# Patient Record
Sex: FEMALE | Race: WHITE | NOT HISPANIC OR LATINO | Employment: OTHER | URBAN - METROPOLITAN AREA
[De-identification: names, ages, dates, MRNs, and addresses within clinical notes are randomized per-mention and may not be internally consistent; named-entity substitution may affect disease eponyms.]

---

## 2017-04-29 ENCOUNTER — HOSPITAL ENCOUNTER (EMERGENCY)
Facility: CLINIC | Age: 47
Discharge: HOME | End: 2017-04-29
Attending: PHYSICIAN ASSISTANT | Admitting: PHYSICIAN ASSISTANT
Payer: COMMERCIAL

## 2017-04-29 VITALS
DIASTOLIC BLOOD PRESSURE: 77 MMHG | TEMPERATURE: 98.2 F | RESPIRATION RATE: 16 BRPM | OXYGEN SATURATION: 100 % | SYSTOLIC BLOOD PRESSURE: 121 MMHG

## 2017-04-29 DIAGNOSIS — H61.23 BILATERAL IMPACTED CERUMEN: Primary | ICD-10-CM

## 2017-04-29 PROCEDURE — 99213 OFFICE O/P EST LOW 20 MIN: CPT | Performed by: PHYSICIAN ASSISTANT

## 2017-04-29 PROCEDURE — 69209 REMOVE IMPACTED EAR WAX UNI: CPT

## 2017-04-29 PROCEDURE — 99213 OFFICE O/P EST LOW 20 MIN: CPT

## 2017-04-29 ASSESSMENT — ENCOUNTER SYMPTOMS
FEVER: 0
NEUROLOGICAL NEGATIVE: 1
MUSCULOSKELETAL NEGATIVE: 1
RESPIRATORY NEGATIVE: 1
CONSTITUTIONAL NEGATIVE: 1

## 2017-04-29 NOTE — DISCHARGE INSTRUCTIONS
Debrox over the counter for treatment of ear wax impaction at home     Earwax (Treated)    Everyone produces earwax from the lining of the ear canal. It lubricates and protects the ear. The wax that forms in the canal slowly moves toward the outside of the ear and falls out. Sometimes wax can build up in the ear canal. This can cause a blockage and loss of hearing. A buildup of earwax was removed from your ear today.  Home care  If you have a tendency to build up wax in the ear canal, you should clear the wax at home regularly, before it causes discomfort. This should be about once every six months.    Unless a medicine was prescribed, you may use an over-the-counter product made for clearing earwax. These contain carbamide peroxide and are available over-the-counter in a kit with a small bulb syringe.    Lie down with the blocked ear facing upward. Apply one dropper full of medicine and wait a few minutes. Grasp the outer ear and wiggle it to help the solution enter the canal.    Lean over a sink or basin with the blocked ear turned downward. Use a rubber bulb syringe filled with warm (not hot or cold) water to rinse the ear several times. Use gentle pressure only. You may need to repeat the irrigation several times before the wax flows out.    If you are having trouble draining all the water out of your ear canal, put a few drops of rubbing alcohol into the ear canal. This will help remove the remaining water.  Don'ts    Don t use cold water to rinse the ear. This will make you dizzy.    Don t perform this procedure if you have an ear infection (ear pain, fever, or fluid draining from the ear).    Don t perform this procedure if you have a punctured eardrum.    Don t use cotton swabs, matches, hairpins, keys, or other objects to  clean  the ear canal. This can cause infection of the ear canal or rupture of the eardrum. Because of their size and shape, cotton swabs can push the earwax deeper into the ear canal  instead of removing it.  Follow-up care  Follow up with your health care provider, or as advised.  When to seek medical advice  Call your health care provider right away if any of these occur:    Worsening ear pain    Fever of 101 F (38.3 C) or higher, or as directed by your health care provider    Hearing does not return to normal after three days of treatment    Fluid drainage or bleeding from the ear canal    Swelling, redness, or tenderness of the outer ear    Headache, neck pain, or stiff neck    5240-4969 [employer]. [address] [place] [address]. All rights reserved. This information is not intended as a substitute for professional medical care. Always follow your healthcare professional's instructions.

## 2017-04-29 NOTE — ED AVS SNAPSHOT
[facility] Emergency Department    [address] [address]    Phone:  [phone]    Fax:  [phone]                                       Hollie Suarez   MRN: [id number]    Department:  [facility] Emergency Department   Date of Visit:  4/29/2017           After Visit Summary Signature Page     I have received my discharge instructions, and my questions have been answered. I have discussed any challenges I see with this plan with the nurse or doctor.    ..........................................................................................................................................  Patient/Patient Representative Signature      ..........................................................................................................................................  Patient Representative Print Name and Relationship to Patient    ..................................................               ................................................  Date                                            Time    ..........................................................................................................................................  Reviewed by Signature/Title    ...................................................              ..............................................  Date                                                            Time

## 2017-04-29 NOTE — ED AVS SNAPSHOT
[facility] Emergency Department    [address] [address]    Phone:  [phone]    Fax:  [phone]                                       Hollie Suarez   MRN: [id number]    Department:  [facility] Emergency Department   Date of Visit:  4/29/2017           Patient Information     Date Of Birth          1970        Your diagnoses for this visit were:     Bilateral impacted cerumen        You were seen by Ashley Vallejo PA-C.      Follow-up Information     Call Wale Ortega MD.    Specialty:  Family Practice    Why:  As needed, For persistent symptoms    Contact information:    [facility]         [address] [place] [address] [phone]          Follow up with [facility] Emergency Department.    Specialty:  EMERGENCY MEDICINE    Why:  As needed, If symptoms worsen    Contact information:    [address] [place] [address] [phone]    Additional information:    The medical center is located at   [address] [place] [address] (between [facility] and   [facility] [place], four miles north   of [place]).        Discharge Instructions       Debrox over the counter for treatment of ear wax impaction at home     Earwax (Treated)    Everyone produces earwax from the lining of the ear canal. It lubricates and protects the ear. The wax that forms in the canal slowly moves toward the outside of the ear and falls out. Sometimes wax can build up in the ear canal. This can cause a blockage and loss of hearing. A buildup of earwax was removed from your ear today.  Home care  If you have a tendency to build up wax in the ear canal, you should clear the wax at home regularly, before it causes discomfort. This should be about once every six months.    Unless a medicine was prescribed, you may use an over-the-counter product made for clearing earwax. These contain carbamide peroxide and are available over-the-counter in a  kit with a small bulb syringe.    Lie down with the blocked ear facing upward. Apply one dropper full of medicine and wait a few minutes. Grasp the outer ear and wiggle it to help the solution enter the canal.    Lean over a sink or basin with the blocked ear turned downward. Use a rubber bulb syringe filled with warm (not hot or cold) water to rinse the ear several times. Use gentle pressure only. You may need to repeat the irrigation several times before the wax flows out.    If you are having trouble draining all the water out of your ear canal, put a few drops of rubbing alcohol into the ear canal. This will help remove the remaining water.  Don'ts    Don t use cold water to rinse the ear. This will make you dizzy.    Don t perform this procedure if you have an ear infection (ear pain, fever, or fluid draining from the ear).    Don t perform this procedure if you have a punctured eardrum.    Don t use cotton swabs, matches, hairpins, keys, or other objects to  clean  the ear canal. This can cause infection of the ear canal or rupture of the eardrum. Because of their size and shape, cotton swabs can push the earwax deeper into the ear canal instead of removing it.  Follow-up care  Follow up with your health care provider, or as advised.  When to seek medical advice  Call your health care provider right away if any of these occur:    Worsening ear pain    Fever of 101 F (38.3 C) or higher, or as directed by your health care provider    Hearing does not return to normal after three days of treatment    Fluid drainage or bleeding from the ear canal    Swelling, redness, or tenderness of the outer ear    Headache, neck pain, or stiff neck    3517-3015 [employer]. [address] [place] [address]. All rights reserved. This information is not intended as a substitute for professional medical care. Always follow your healthcare professional's instructions.          24 Hour Appointment Hotline        To make an appointment at any [facility] call [phone] ([phone]). If you don't have a family doctor or clinic, we will help you find one. [facility] clinics are conveniently located to serve the needs of you and your family.             Review of your medicines      Our records show that you are taking the medicines listed below. If these are incorrect, please call your family doctor or clinic.        Dose / Directions Last dose taken    ADVIL 200 MG tablet   Generic drug:  ibuprofen        4 tablets by mouth every 8 hours as needed   Refills:  0        bisacodyl 5 MG EC tablet   Commonly known as:  DULCOLAX   Dose:  5 mg   Quantity:  30 tablet        Take 1 tablet (5 mg) by mouth daily as needed for constipation   Refills:  0        DIAZEPAM PO   Dose:  2 mg        Take 2 mg by mouth as needed for anxiety   Refills:  0        LATISSE 0.03 % Soln   Generic drug:  bimatoprost        Externally apply topically every evening To eyelashes   Refills:  0        LEXAPRO PO   Dose:  5 mg        Take 5 mg by mouth daily   Refills:  0        PHENTERMINE HCL PO   Dose:  15 mg        Take 15 mg by mouth daily   Refills:  0                Orders Needing Specimen Collection     None      Pending Results     No orders found from 4/27/2017 to 4/30/2017.            Pending Culture Results     No orders found from 4/27/2017 to 4/30/2017.            Test Results From Your Hospital Stay               Thank you for choosing [facility]       Thank you for choosing [facility] for your care. Our goal is always to provide you with excellent care. Hearing back from our patients is one way we can continue to improve our services. Please take a few minutes to complete the written survey that you may receive in the mail after you visit with us. Thank you!        [employer] Information     [employer] lets you send messages to your doctor, view your test results, renew your prescriptions, schedule appointments and more. To sign up, go to  [email or web address] . Click on \"Log in\" on the left side of the screen, which will take you to the Welcome page. Then click on \"Sign up Now\" on the right side of the page.     You will be asked to enter the access code listed below, as well as some personal information. Please follow the directions to create your username and password.     Your access code is: [email or web address]  Expires: 2017  3:34 PM     Your access code will  in 90 days. If you need help or a new code, please call your [place] [facility] clinic or [phone].        Care EveryWhere ID     This is your Care EveryWhere ID. This could be used by other organizations to access your [facility] medical records  [id number]        After Visit Summary       This is your record. Keep this with you and show to your community pharmacist(s) and doctor(s) at your next visit.                  "

## 2017-04-29 NOTE — ED PROVIDER NOTES
"  History     Chief Complaint   Patient presents with     Otalgia     Having R ear pain, having a hard time hearing too.     HPI  Hollie Suarez is a 46 year old female who presents with complaints of bilateral muffled hearing for the past 2 weeks.  States she developed pain in her right ear last night.  Pt reports the sensation that she feels like she is \"underwater.\"  Denies fevers, chills, cough, sore throat, sinus pressure, nasal congestion, rhinorrhea, or ear drainage.    I have reviewed the Medications, Allergies, Past Medical and Surgical History, and Social History in the Epic system.    Review of Systems   Constitutional: Negative.  Negative for fever.   HENT: Positive for ear pain. Negative for ear discharge.    Respiratory: Negative.    Musculoskeletal: Negative.    Skin: Negative.    Neurological: Negative.    All other systems reviewed and are negative.      Physical Exam   BP: 121/77  Heart Rate: 73  Temp: 98.2  F (36.8  C)  Resp: 16  SpO2: 100 %  Physical Exam   Constitutional: She is oriented to person, place, and time. She appears well-developed and well-nourished. No distress.   HENT:   Head: Normocephalic and atraumatic.   Right Ear: Tympanic membrane, external ear and ear canal normal.   Left Ear: Tympanic membrane, external ear and ear canal normal.   Nose: Nose normal. No rhinorrhea.   Mouth/Throat: Oropharynx is clear and moist. No oropharyngeal exudate or posterior oropharyngeal erythema.   Bilateral cerumen impactions; after irrigation, no evidence of otitis media or externa   Eyes: Conjunctivae and EOM are normal. Pupils are equal, round, and reactive to light.   Neck: Normal range of motion. Neck supple. No rigidity.   Cardiovascular: Normal rate, regular rhythm and normal heart sounds.    Pulmonary/Chest: Effort normal and breath sounds normal. No stridor. No respiratory distress. She has no wheezes.   Lymphadenopathy:     She has no cervical adenopathy.   Neurological: She is alert " "and oriented to person, place, and time.   Skin: Skin is warm and dry.       ED Course     ED Course     Procedures      Assessments & Plan (with Medical Decision Making)     Pt is a 46 year old female who presents with complaints of bilateral muffled hearing for the past 2 weeks.  States she developed pain in her right ear last night.  Pt reports the sensation that she feels like she is \"underwater.\"  Pt is afebrile on arrival.  Exam as above.  Pt is noted to have bilateral cerumen impactions.  No evidence of otitis media or externa after ear irrigation.  Pt states she feels much better and symptoms have resolved.  Hand-outs provided.    Patient was instructed to follow-up with PCP as needed for continued care and management or sooner if new or worsening symptoms.  She is to return to the ED for persistent and/or worsening symptoms.  Patient expressed understanding of the diagnosis and plan and was discharged home in good condition.    I have reviewed the nursing notes.    I have reviewed the findings, diagnosis, plan and need for follow up with the patient.    Discharge Medication List as of 4/29/2017  3:35 PM          Final diagnoses:   Bilateral impacted cerumen       4/29/2017   [facility] EMERGENCY DEPARTMENT     Ashley Vallejo PA-C  04/29/17 4333       Ashley Vallejo PA-C  04/29/17 1706    "

## 2018-03-15 ENCOUNTER — HOSPITAL ENCOUNTER (EMERGENCY)
Facility: CLINIC | Age: 48
Discharge: HOME | End: 2018-03-15
Attending: EMERGENCY MEDICINE | Admitting: EMERGENCY MEDICINE
Payer: COMMERCIAL

## 2018-03-15 VITALS
DIASTOLIC BLOOD PRESSURE: 77 MMHG | OXYGEN SATURATION: 100 % | SYSTOLIC BLOOD PRESSURE: 114 MMHG | TEMPERATURE: 98.6 F | RESPIRATION RATE: 13 BRPM

## 2018-03-15 DIAGNOSIS — R07.2 SUBSTERNAL CHEST PAIN: ICD-10-CM

## 2018-03-15 LAB
ALBUMIN SERPL-MCNC: 3.6 G/DL (ref 3.4–5)
ALP SERPL-CCNC: 66 U/L (ref 40–150)
ALT SERPL W P-5'-P-CCNC: 22 U/L (ref 0–50)
ANION GAP SERPL CALCULATED.3IONS-SCNC: 7 MMOL/L (ref 3–14)
AST SERPL W P-5'-P-CCNC: 29 U/L (ref 0–45)
BASOPHILS # BLD AUTO: 0 10E9/L (ref 0–0.2)
BASOPHILS NFR BLD AUTO: 0.5 %
BILIRUB SERPL-MCNC: 0.5 MG/DL (ref 0.2–1.3)
BUN SERPL-MCNC: 11 MG/DL (ref 7–30)
CALCIUM SERPL-MCNC: 8.7 MG/DL (ref 8.5–10.1)
CHLORIDE SERPL-SCNC: 107 MMOL/L (ref 94–109)
CO2 SERPL-SCNC: 27 MMOL/L (ref 20–32)
CREAT SERPL-MCNC: 0.69 MG/DL (ref 0.52–1.04)
DIFFERENTIAL METHOD BLD: NORMAL
EOSINOPHIL # BLD AUTO: 0.1 10E9/L (ref 0–0.7)
EOSINOPHIL NFR BLD AUTO: 1.7 %
ERYTHROCYTE [DISTWIDTH] IN BLOOD BY AUTOMATED COUNT: 12.5 % (ref 10–15)
GFR SERPL CREATININE-BSD FRML MDRD: 90 ML/MIN/1.7M2
GLUCOSE SERPL-MCNC: 85 MG/DL (ref 70–99)
HCT VFR BLD AUTO: 42.9 % (ref 35–47)
HGB BLD-MCNC: 14.4 G/DL (ref 11.7–15.7)
IMM GRANULOCYTES # BLD: 0 10E9/L (ref 0–0.4)
IMM GRANULOCYTES NFR BLD: 0 %
LYMPHOCYTES # BLD AUTO: 1.8 10E9/L (ref 0.8–5.3)
LYMPHOCYTES NFR BLD AUTO: 31 %
MCH RBC QN AUTO: 29.8 PG (ref 26.5–33)
MCHC RBC AUTO-ENTMCNC: 33.6 G/DL (ref 31.5–36.5)
MCV RBC AUTO: 89 FL (ref 78–100)
MONOCYTES # BLD AUTO: 0.4 10E9/L (ref 0–1.3)
MONOCYTES NFR BLD AUTO: 7.4 %
NEUTROPHILS # BLD AUTO: 3.4 10E9/L (ref 1.6–8.3)
NEUTROPHILS NFR BLD AUTO: 59.4 %
PLATELET # BLD AUTO: 263 10E9/L (ref 150–450)
POTASSIUM SERPL-SCNC: 4.2 MMOL/L (ref 3.4–5.3)
PROT SERPL-MCNC: 7.3 G/DL (ref 6.8–8.8)
RBC # BLD AUTO: 4.83 10E12/L (ref 3.8–5.2)
SODIUM SERPL-SCNC: 141 MMOL/L (ref 133–144)
TROPONIN I SERPL-MCNC: <0.015 UG/L (ref 0–0.04)
WBC # BLD AUTO: 5.8 10E9/L (ref 4–11)

## 2018-03-15 PROCEDURE — 80053 COMPREHEN METABOLIC PANEL: CPT | Performed by: EMERGENCY MEDICINE

## 2018-03-15 PROCEDURE — 99283 EMERGENCY DEPT VISIT LOW MDM: CPT | Mod: Z6 | Performed by: EMERGENCY MEDICINE

## 2018-03-15 PROCEDURE — 85025 COMPLETE CBC W/AUTO DIFF WBC: CPT | Performed by: EMERGENCY MEDICINE

## 2018-03-15 PROCEDURE — 93010 ELECTROCARDIOGRAM REPORT: CPT | Mod: Z6 | Performed by: EMERGENCY MEDICINE

## 2018-03-15 PROCEDURE — 93005 ELECTROCARDIOGRAM TRACING: CPT | Performed by: EMERGENCY MEDICINE

## 2018-03-15 PROCEDURE — 84484 ASSAY OF TROPONIN QUANT: CPT | Performed by: EMERGENCY MEDICINE

## 2018-03-15 PROCEDURE — 99283 EMERGENCY DEPT VISIT LOW MDM: CPT | Performed by: EMERGENCY MEDICINE

## 2018-03-15 NOTE — LETTER
March 15, 2018      To Whom It May Concern:      Hollie Suarez was seen in our Emergency Department today, 03/15/18.   Patient was evaluated in the emergency department for chest discomfort.  Please excuse her from employment today.  May return to full work duty tomorrow.      Sincerely,    Corby Collier Dr.  [facility] ED Staff Physician

## 2018-03-15 NOTE — ED AVS SNAPSHOT
[facility] Emergency Department    [address] [address]    Phone:  [phone]    Fax:  [phone]                                       Hollie Suarez   MRN: [id number]    Department:  [facility] Emergency Department   Date of Visit:  3/15/2018           After Visit Summary Signature Page     I have received my discharge instructions, and my questions have been answered. I have discussed any challenges I see with this plan with the nurse or doctor.    ..........................................................................................................................................  Patient/Patient Representative Signature      ..........................................................................................................................................  Patient Representative Print Name and Relationship to Patient    ..................................................               ................................................  Date                                            Time    ..........................................................................................................................................  Reviewed by Signature/Title    ...................................................              ..............................................  Date                                                            Time

## 2018-03-15 NOTE — ED PROVIDER NOTES
"  History     Chief Complaint   Patient presents with     Chest Pain     Pt went to work this morning, feeling fine.  Had coffee this morning, no food.  Started having chest pain in middle of chest and into L shoulder.  Pain has been off and on \"a little.      HPI     Hollie Suarez is a 47 year old female who has a history of smoking, genital herpes, hysterectomy, back pain, and human papillomavirus who presents to the ED via private car for evaluation of chest pain. Her discomfort began substernal and radiated to her left shoulder.  The pain is described as pressure or an ache, she denied any sharp pain.  She states she is in some discomfort at this time.  She has been belching and burping along with her discomfort.  No discomfort on exertion.  Patient states she awoke this morning and was able to perform her normal routine until 8:00 am.  At first she believed it to be indigestion.  This episode lasted 5-10 minutes, wax and wanes.  She states the pain is different than previous symptoms of indigestion.     She denies any history of heart disease or hypertension but does report she has had a small cough for a few weeks.  She does not feel this cough is any different than normal.        Problem List:    Patient Active Problem List    Diagnosis Date Noted     Constipation, unspecified constipation type 11/09/2015     Priority: Medium     Refused influenza vaccine 11/09/2015     Priority: Medium     Refused diphtheria-pertussis-tetanus (DPT) vaccination 11/09/2015     Priority: Medium     URI (upper respiratory infection) 11/25/2013     Priority: Medium     S/P hysterectomy 04/29/2011     Priority: Medium     Back pain 04/29/2011     Priority: Medium     CARDIOVASCULAR SCREENING; LDL GOAL LESS THAN 160 10/31/2010     Priority: Medium     Infantile cerebral palsy (H) 12/21/2007     Priority: Medium     Occasional valium use  Problem list name updated by automated process. Provider to review       Human " papillomavirus in conditions classified elsewhere and of unspecified site 10/22/2007     Priority: Medium     10/10/07:Pap--NIL, + HPV 66. Repap with HPV 1 year.       Genital herpes 03/20/2007     Priority: Medium     Problem list name updated by automated process. Provider to review          Past Medical History:    No past medical history on file.    Past Surgical History:    Past Surgical History:   Procedure Laterality Date     HYSTERECTOMY, PAP NO LONGER INDICATED  2009    Total        Family History:    Family History   Problem Relation Age of Onset     CANCER Mother      smooth muscle tissues     Neurologic Disorder Father      aneurism     CANCER Maternal Grandmother      cervical cancer       Social History:  Marital Status:   [2]  Social History   Substance Use Topics     Smoking status: Former Smoker     Packs/day: 1.00     Types: Cigarettes     Quit date: 9/1/2015     Smokeless tobacco: Not on file     Alcohol use Yes      Comment: 1 glass of wine weekly        Medications:      Escitalopram Oxalate (LEXAPRO PO)   PHENTERMINE HCL PO   DIAZEPAM PO   bimatoprost (LATISSE) 0.03 % SOLN   bisacodyl (DULCOLAX) 5 MG EC tablet   ADVIL 200 MG OR TABS         Review of Systems  All pertinent positives and negatives are documented in the HPI.  All others reviewed and are negative .    Physical Exam   BP: (!) 137/96  Heart Rate: 79  Temp: 98.6  F (37  C)  Resp: 16  SpO2: 99 %      Physical Exam  Vital signs reviewed  Nursing notes reviewed  Head:  Normocephalic.    Eyes:  PERRLA, full EOM.  External exams normal.    Ears:  Normal pinnae, canals, and TM's.    Nose:  Patent, without deformity.    Throat:  Moist mucous membranes without lesions, erythema, or exudate.    Neck:  Supple, without masses, lymphadenopathy or tenderness.    Respiratory:  Normal respiratory effort.  Lungs are clear with good breath sounds.    Heart:  RR without murmurs, rubs, or gallops.  Abdomen: No right upper quadrant epigastric  discomfort.  Abdomen is nontender.  Lower extremities show a muscle atrophy and some joint contracture from cerebral palsy.  Skin: Warm, dry with no diaphoresis.  No rash.    ED Course     ED Course     Procedures  EKG:  Interpretation by Corby Collier DO.   Indication: Chest pain  Normal sinus rhythm.  Rate is 7 bpm.  Axis.  Normal repolarization.  No ectopy.  Comparison EKG dated 11/25/2007 shows no change.    Impressions normal EKG          11:08 AM Patient assessed  12:41 PM  Results for orders placed or performed during the hospital encounter of 03/15/18   CBC with platelets differential   Result Value Ref Range    WBC 5.8 4.0 - 11.0 10e9/L    RBC Count 4.83 3.8 - 5.2 10e12/L    Hemoglobin 14.4 11.7 - 15.7 g/dL    Hematocrit 42.9 35.0 - 47.0 %    MCV 89 78 - 100 fl    MCH 29.8 26.5 - 33.0 pg    MCHC 33.6 31.5 - 36.5 g/dL    RDW 12.5 10.0 - 15.0 %    Platelet Count 263 150 - 450 10e9/L    Diff Method Automated Method     % Neutrophils 59.4 %    % Lymphocytes 31.0 %    % Monocytes 7.4 %    % Eosinophils 1.7 %    % Basophils 0.5 %    % Immature Granulocytes 0.0 %    Absolute Neutrophil 3.4 1.6 - 8.3 10e9/L    Absolute Lymphocytes 1.8 0.8 - 5.3 10e9/L    Absolute Monocytes 0.4 0.0 - 1.3 10e9/L    Absolute Eosinophils 0.1 0.0 - 0.7 10e9/L    Absolute Basophils 0.0 0.0 - 0.2 10e9/L    Abs Immature Granulocytes 0.0 0 - 0.4 10e9/L   Comprehensive metabolic panel   Result Value Ref Range    Sodium 141 133 - 144 mmol/L    Potassium 4.2 3.4 - 5.3 mmol/L    Chloride 107 94 - 109 mmol/L    Carbon Dioxide 27 20 - 32 mmol/L    Anion Gap 7 3 - 14 mmol/L    Glucose 85 70 - 99 mg/dL    Urea Nitrogen 11 7 - 30 mg/dL    Creatinine 0.69 0.52 - 1.04 mg/dL    GFR Estimate 90 >60 mL/min/1.7m2    GFR Estimate If Black >90 >60 mL/min/1.7m2    Calcium 8.7 8.5 - 10.1 mg/dL    Bilirubin Total 0.5 0.2 - 1.3 mg/dL    Albumin 3.6 3.4 - 5.0 g/dL    Protein Total 7.3 6.8 - 8.8 g/dL    Alkaline Phosphatase 66 40 - 150 U/L    ALT 22 0 - 50 U/L     AST 29 0 - 45 U/L   Troponin I   Result Value Ref Range    Troponin I ES <0.015 0.000 - 0.045 ug/L       Assessments & Plan (with Medical Decision Making)  47-year-old female, smoker presents with substernal chest discomfort.  Onset while sitting at her desk after drinking coffee.  Moved into the left shoulder.  No other associated symptoms.  Resolved by the time she arrived to the emergency department.    Examination cardiopulmonary normal.  No calf swelling or asymmetry.  No Homans test.    EKG normal.  Troponin normal.  Suspect atypical causes for chest discomfort.  Only risk factor is smoking.  Advised if she starts having recurrent symptoms or exertional symptoms she needs to contact her primary clinic provider to arrange for stress echo dobutamine.  Unable to fully exercise on a treadmill due to CP.       I have reviewed the nursing notes.    I have reviewed the findings, diagnosis, plan and need for follow up with the patient.      New Prescriptions    No medications on file       Final diagnoses:   Substernal chest pain     This document serves as a record of the services and decisions personally performed and made by Corby Collier, *. It was created on HIS/HER behalf by Sadaf Ramirez, a trained medical scribe. The creation of this document is based the provider's statements to the medical scribe.  Sadaf Ramirez 10:23 AM 3/15/2018    Provider:   The information in this document, created by the medical scribe for me, accurately reflects the services I personally performed and the decisions made by me. I have reviewed and approved this document for accuracy prior to leaving the patient care area.  Corby Collier, * 10:23 AM 3/15/2018    3/15/2018   [facility] EMERGENCY DEPARTMENT     Corby Collier,   03/15/18 1246

## 2018-03-15 NOTE — ED NOTES
"Pt c/o mid sternal chest pain - radiates to her left shoulder - started this morning. Patient states, \"it kind of feels like indigestion, but I'm not sure and I smoke so I want to be checked out\" - Pt currently denies any 'pain' although describes as uncomfortable. Patient works at home and has CP so she is fairly sedentary. Denies any leg pain or shortness of breath.  "

## 2018-03-15 NOTE — ED AVS SNAPSHOT
[facility] Emergency Department    [address] [address]    Phone:  [phone]    Fax:  [phone]                                       Hollie Suarez   MRN: [id number]    Department:  [facility] Emergency Department   Date of Visit:  3/15/2018           Patient Information     Date Of Birth          1970        Your diagnoses for this visit were:     Substernal chest pain        You were seen by Corby Collier DO.        Discharge Instructions       Cardiac monitor showed no cardiac arrhythmia during emergency room visit.  12-lead EKG was normal.  Cardiac enzyme = troponin was normal.  Cardiac examination was normal.  At this time is no indication that discomfort came from cardiac source.  Could have been transient discomfort from esophageal spasm after drinking coffee.  I recommend continuing to monitor for any recurrence of substernal chest discomfort.  Specifically be monitoring for activity-induced symptoms.  If noted contact your primary clinic provider to arrange for dobutamine cardiac stress echo.  This is a stress test that would allow us to further evaluate for coronary artery disease.  This test does not require running on a treadmill.     Strongly advised to follow-up the primary clinic provider to ask them to assist in developing a smoking cessation program.      24 Hour Appointment Hotline       To make an appointment at any [facility], call [phone] ([phone]). If you don't have a family doctor or clinic, we will help you find one. [facility] clinics are conveniently located to serve the needs of you and your family.             Review of your medicines      Our records show that you are taking the medicines listed below. If these are incorrect, please call your family doctor or clinic.        Dose / Directions Last dose taken    ADVIL 200 MG tablet   Generic drug:  ibuprofen        4 tablets by mouth every 8 hours as needed    Refills:  0        bisacodyl 5 MG EC tablet   Commonly known as:  DULCOLAX   Dose:  5 mg   Quantity:  30 tablet        Take 1 tablet (5 mg) by mouth daily as needed for constipation   Refills:  0        DIAZEPAM PO   Dose:  2 mg        Take 2 mg by mouth as needed for anxiety   Refills:  0        LATISSE 0.03 % Soln   Generic drug:  bimatoprost        Externally apply topically every evening To eyelashes   Refills:  0        LEXAPRO PO   Dose:  5 mg        Take 5 mg by mouth daily   Refills:  0        PHENTERMINE HCL PO   Dose:  15 mg        Take 15 mg by mouth daily   Refills:  0                Procedures and tests performed during your visit     CBC with platelets differential    Comprehensive metabolic panel    EKG 12 lead    Peripheral IV catheter    Troponin I      Orders Needing Specimen Collection     None      Pending Results     No orders found from 3/13/2018 to 3/16/2018.            Pending Culture Results     No orders found from 3/13/2018 to 3/16/2018.            Pending Results Instructions     If you had any lab results that were not finalized at the time of your Discharge, you can call the ED Lab Result RN at [phone]. You will be contacted by this team for any positive Lab results or changes in treatment. The nurses are available 7 days a week from 10A to 6:30P.  You can leave a message 24 hours per day and they will return your call.        Test Results From Your Hospital Stay        3/15/2018 10:44 AM      Component Results     Component Value Ref Range & Units Status    WBC 5.8 4.0 - 11.0 10e9/L Final    RBC Count 4.83 3.8 - 5.2 10e12/L Final    Hemoglobin 14.4 11.7 - 15.7 g/dL Final    Hematocrit 42.9 35.0 - 47.0 % Final    MCV 89 78 - 100 fl Final    MCH 29.8 26.5 - 33.0 pg Final    MCHC 33.6 31.5 - 36.5 g/dL Final    RDW 12.5 10.0 - 15.0 % Final    Platelet Count 263 150 - 450 10e9/L Final    Diff Method Automated Method  Final    % Neutrophils 59.4 % Final    % Lymphocytes 31.0 % Final     % Monocytes 7.4 % Final    % Eosinophils 1.7 % Final    % Basophils 0.5 % Final    % Immature Granulocytes 0.0 % Final    Absolute Neutrophil 3.4 1.6 - 8.3 10e9/L Final    Absolute Lymphocytes 1.8 0.8 - 5.3 10e9/L Final    Absolute Monocytes 0.4 0.0 - 1.3 10e9/L Final    Absolute Eosinophils 0.1 0.0 - 0.7 10e9/L Final    Absolute Basophils 0.0 0.0 - 0.2 10e9/L Final    Abs Immature Granulocytes 0.0 0 - 0.4 10e9/L Final         3/15/2018 11:28 AM      Component Results     Component Value Ref Range & Units Status    Sodium 141 133 - 144 mmol/L Final    Potassium 4.2 3.4 - 5.3 mmol/L Final    Specimen slightly hemolyzed, potassium may be falsely elevated    Chloride 107 94 - 109 mmol/L Final    Carbon Dioxide 27 20 - 32 mmol/L Final    Anion Gap 7 3 - 14 mmol/L Final    Glucose 85 70 - 99 mg/dL Final    Urea Nitrogen 11 7 - 30 mg/dL Final    Creatinine 0.69 0.52 - 1.04 mg/dL Final    GFR Estimate 90 >60 mL/min/1.7m2 Final    Non  GFR Calc    GFR Estimate If Black >90 >60 mL/min/1.7m2 Final    African American GFR Calc    Calcium 8.7 8.5 - 10.1 mg/dL Final    Bilirubin Total 0.5 0.2 - 1.3 mg/dL Final    Albumin 3.6 3.4 - 5.0 g/dL Final    Protein Total 7.3 6.8 - 8.8 g/dL Final    Alkaline Phosphatase 66 40 - 150 U/L Final    ALT 22 0 - 50 U/L Final    AST 29 0 - 45 U/L Final    Specimen is hemolyzed which can falsely elevate AST. Analysis of a   non-hemolyzed specimen may result in a lower value.           3/15/2018 11:28 AM      Component Results     Component Value Ref Range & Units Status    Troponin I ES <0.015 0.000 - 0.045 ug/L Final    The 99th percentile for upper reference range is 0.045 ug/L.  Troponin values   in the range of 0.045 - 0.120 ug/L may be associated with risks of adverse   clinical events.                  Thank you for choosing [facility]       Thank you for choosing [facility] for your care. Our goal is always to provide you with excellent care. Hearing back from our patients is  "one way we can continue to improve our services. Please take a few minutes to complete the written survey that you may receive in the mail after you visit with us. Thank you!        [employer] Information     [employer] lets you send messages to your doctor, view your test results, renew your prescriptions, schedule appointments and more. To sign up, go to [email or web address] . Click on \"Log in\" on the left side of the screen, which will take you to the Welcome page. Then click on \"Sign up Now\" on the right side of the page.     You will be asked to enter the access code listed below, as well as some personal information. Please follow the directions to create your username and password.     Your access code is: [email or web address]  Expires: 2018  1:03 PM     Your access code will  in 90 days. If you need help or a new code, please call your [facility] clinic or [phone].        Care EveryWhere ID     This is your Care EveryWhere ID. This could be used by other organizations to access your [facility] medical records  [id number]        Equal Access to Services     TISH WHITTEN : Hadnataliya Stone, bryan ye, david nielsen, raiza byrne. So [facility] [phone].    ATENCIÓN: Si habla español, tiene a daley disposición servicios gratuitos de asistencia lingüística. Jessica al [phone].    We comply with applicable federal civil rights laws and [place] laws. We do not discriminate on the basis of race, color, national origin, age, disability, sex, sexual orientation, or gender identity.            After Visit Summary       This is your record. Keep this with you and show to your community pharmacist(s) and doctor(s) at your next visit.                  "

## 2018-03-15 NOTE — DISCHARGE INSTRUCTIONS
Cardiac monitor showed no cardiac arrhythmia during emergency room visit.  12-lead EKG was normal.  Cardiac enzyme = troponin was normal.  Cardiac examination was normal.  At this time is no indication that discomfort came from cardiac source.  Could have been transient discomfort from esophageal spasm after drinking coffee.  I recommend continuing to monitor for any recurrence of substernal chest discomfort.  Specifically be monitoring for activity-induced symptoms.  If noted contact your primary clinic provider to arrange for dobutamine cardiac stress echo.  This is a stress test that would allow us to further evaluate for coronary artery disease.  This test does not require running on a treadmill.     Strongly advised to follow-up the primary clinic provider to ask them to assist in developing a smoking cessation program.

## 2021-04-12 ENCOUNTER — TRANSFERRED RECORDS (OUTPATIENT)
Dept: HEALTH INFORMATION MANAGEMENT | Facility: CLINIC | Age: 51
End: 2021-04-12

## 2021-07-21 ENCOUNTER — TRANSFERRED RECORDS (OUTPATIENT)
Dept: HEALTH INFORMATION MANAGEMENT | Facility: CLINIC | Age: 51
End: 2021-07-21
Payer: COMMERCIAL

## 2021-07-21 ENCOUNTER — RECORDS - HEALTHEAST (OUTPATIENT)
Dept: ADMINISTRATIVE | Facility: CLINIC | Age: 51
End: 2021-07-21

## 2021-08-12 ENCOUNTER — TRANSCRIBE ORDERS (OUTPATIENT)
Dept: OTHER | Age: 51
End: 2021-08-12

## 2021-08-12 DIAGNOSIS — R79.1 POSITIVE DILUTE RUSSELL'S VIPER VENOM TIME TEST (DRVVT): ICD-10-CM

## 2021-08-12 DIAGNOSIS — I26.99 PULMONARY EMBOLISM (H): Primary | ICD-10-CM

## 2021-10-25 ENCOUNTER — OFFICE VISIT (OUTPATIENT)
Dept: HEMATOLOGY | Facility: CLINIC | Age: 51
End: 2021-10-25
Attending: FAMILY MEDICINE
Payer: COMMERCIAL

## 2021-10-25 ENCOUNTER — TRANSFERRED RECORDS (OUTPATIENT)
Dept: HEALTH INFORMATION MANAGEMENT | Facility: CLINIC | Age: 51
End: 2021-10-25

## 2021-10-25 VITALS
RESPIRATION RATE: 14 BRPM | TEMPERATURE: 98.4 F | OXYGEN SATURATION: 99 % | WEIGHT: 146.1 LBS | SYSTOLIC BLOOD PRESSURE: 139 MMHG | BODY MASS INDEX: 26.89 KG/M2 | HEIGHT: 62 IN | HEART RATE: 90 BPM | DIASTOLIC BLOOD PRESSURE: 90 MMHG

## 2021-10-25 DIAGNOSIS — Z86.711 HISTORY OF PULMONARY EMBOLISM: Primary | ICD-10-CM

## 2021-10-25 LAB — D DIMER PPP FEU-MCNC: 0.6 UG/ML FEU (ref 0–0.5)

## 2021-10-25 PROCEDURE — 86147 CARDIOLIPIN ANTIBODY EA IG: CPT | Performed by: INTERNAL MEDICINE

## 2021-10-25 PROCEDURE — 85379 FIBRIN DEGRADATION QUANT: CPT | Performed by: INTERNAL MEDICINE

## 2021-10-25 PROCEDURE — 36415 COLL VENOUS BLD VENIPUNCTURE: CPT | Performed by: INTERNAL MEDICINE

## 2021-10-25 PROCEDURE — 86146 BETA-2 GLYCOPROTEIN ANTIBODY: CPT | Performed by: INTERNAL MEDICINE

## 2021-10-25 PROCEDURE — G0463 HOSPITAL OUTPT CLINIC VISIT: HCPCS

## 2021-10-25 PROCEDURE — 85613 RUSSELL VIPER VENOM DILUTED: CPT | Performed by: INTERNAL MEDICINE

## 2021-10-25 PROCEDURE — 85390 FIBRINOLYSINS SCREEN I&R: CPT | Mod: 26 | Performed by: PATHOLOGY

## 2021-10-25 PROCEDURE — 99205 OFFICE O/P NEW HI 60 MIN: CPT | Performed by: INTERNAL MEDICINE

## 2021-10-25 RX ORDER — BUPROPION HYDROCHLORIDE 150 MG/1
TABLET ORAL
COMMUNITY
Start: 2021-04-20

## 2021-10-25 ASSESSMENT — PAIN SCALES - GENERAL: PAINLEVEL: NO PAIN (0)

## 2021-10-25 ASSESSMENT — MIFFLIN-ST. JEOR: SCORE: 1229.14

## 2021-10-25 NOTE — PATIENT INSTRUCTIONS
Laboratory test today:  Lupus anticoagulant  Cardiolipin antibodies  Beta-2 glycoprotein 1 antibodies  D-dimer    Depending on these results, a decision will be made about whether to continue on Xarelto and at what dose.  I a one of the nurses will be in touch regarding the results.    Follow-up by video visit in approximately 1 year

## 2021-10-25 NOTE — PROGRESS NOTES
Hematology Clinic consult note    Reasons for Consult: -History of pulmonary embolism and positive DRVVT    History of Present Illness: Ms. Suarez is a 51-year-old woman referred by Dr. Dominick Solis for pulmonary embolism diagnosed 4/12/2021.  She is accompanied by her son.  In early April, she developed some chest pain and worsening dyspnea that progressed over a couple of weeks.  She has been under a lot of stress, because her  was  her and she was drinking more alcohol and smoking more than she normally does.  She has cerebral palsy and walks with a walker, and occasionally has falls.  However in April, she had a fall and was down for probably about 20 hours, she thinks because of her problems with the breathing.  She eventually was able to make it to a phone to call her son who sent an ambulance to bring her to the hospital.  There she was diagnosed with a pulmonary embolism on CT angiogram.  (I do not have any records from  the hospitalization, other than a few lab results).  She reports that she was put on some IV medications for a while and then a few days later was discharged on the Xarelto.  She did not have any travel, surgery, infections in the month prior to the pulmonary embolism diagnosis.  She does not think she had COVID, since she has been working from home and really has not had, tach with outside people.    She has been on rivaroxaban 20 mg daily since hospital discharge, so about 6 months.  She reports that she has not taken it for about the past 3 days because she is visiting her son and forgot her medications.  So most recent dose of Xarelto was 3 days ago.  She is not having any bleeding from Xarelto-no epistaxis, gum bleeding, melena, hematochezia, hematuria.  She does have some bruising from falls which she says has been worse while on the Xarelto.    Past Medical History:  -Cerebral palsy with spasticity  -Pulmonary embolism 4/12/ 2021  -depression and  "anxiety  -osteoporosis  -GERD  -Status post hysterectomy including ovaries and cervix at about age 39  -History of numerous orthopedic surgeries, most recent 20 years ago.      Medications:  -reviewed    Family History: She is adopted, so does not know a lot of history, apparently her father  of a brain aneurysm.  Her son has factor V Leiden from his father's side.     Social History: smoking- current, 1/2 ppd,  Alcohol- occasional ,  She has been working from home for the [employer], sits for long periods of time.    Review of systems:  As in HPI.  She had pneumonia about a month ago, requiring antibiotics, has recovered.  Frequent falls.  She has occasional bilateral leg swelling.  She has had ultrasounds in the past that were negative for DVT.  She has had a mammogram within the last 2 years.  She had negative stool fit test 2021 the rest of the > 10 point review of systems was negative.      PHYSICAL EXAMINATION:  BP (!) 139/90 (BP Location: Right arm, Patient Position: Sitting, Cuff Size: Adult Regular)   Pulse 90   Temp 98.4  F (36.9  C) (Oral)   Resp 14   Ht 1.58 m (5' 2.2\")   Wt 66.3 kg (146 lb 1.6 oz)   LMP 2009   SpO2 99%   BMI 26.55 kg/m      General appearance:  Patient is 51 year old woman in no acute distress.  She needs significant assistance to climb up onto the exam table.  Gait with walker is very disjointed.  HEENT:  No pallor, icterus, or mucositis.  No thyromegaly.   Lymph nodes:  No cervical, supraclavicular, axillary, or inguinal lymphadenopathy.   Lungs:  Clear to auscultation bilaterally.   Heart:  Regular rate and rhythm; no S3 S4 or murmer.     Abdomen:  Positive bowel sounds, soft and nontender, nondistended.  No hepatomegaly. No splenomegaly appreciated.    Extremities:  No joint swelling or tenderness.  No ankle edema.     Skin:  No rash, no petechiae or ecchymoses.      Labs: From [facility]  2021 PT 11.3 (9.4-12.5) " INR 1.0 (0.9-1.1)  DRVTT screen ratio 1.33 (less than 1.20) PTT 29 (25-37)  Antithrombin 100 (normal 80-1 30)  Protein C activity 117 (70-1 50) protein antigen free 88 (65-1 60)  APC ratio 3.3 (greater or equal to 2.3)    Assessment and Recommendation: -This is a 51-year-old woman what appears to be an unprovoked pulmonary embolism.  It seems that she was having symptoms of chest pain and shortness of breath prior to her severe fall, so I do not think that fall and prolonged time on the floor was the cause of the pulmonary embolism.  She has no other obvious provoking factors in her history.  She has no prior DVT or PE.  On the laboratory testing, she had a mildly prolonged DRVVT-was not clear to me from the records is whether she was on rivaroxaban at the time the DRVVT test was done.  Rivaroxaban (Xarelto) can interfere with the testing for lupus anticoagulant.  In this setting of unprovoked pulmonary embolism, it would be useful to know if she has a triple positive antiphospholipid syndrome-meaning positive lupus anticoagulant, positive beta-2 glycoprotein 1 antibodies, and positive cardiolipin antibodies.  Since she has not taken the rivaroxaban for 3 days, there should no longer be any interference with the lupus anticoagulant test.  It would be useful to get those today, because if she is triple positive, that means that anticoagulation should be changed to warfarin.    If she does not have a triple positive APS, then the question becomes whether she should continue on anticoagulation and at what dose.  Risk factors for major bleeding for patients on indefinite anticoagulation include female sex, age greater than 65 years, renal insufficiency, history of bleeding, concomitant use of antiplatelet therapy and anemia.  The only one of these factors she has is that she is female.  Use of a D-dimer can also help in decision-making about long-term anticoagulation.  If the D-dimer is elevated, then the risk of  recurrent thrombosis is also higher.      In discussing with her, even though she has some falls and increased bruising, she has much less concerned about that than she is about the risk of a recurrent pulmonary embolism.  I am inclined to agree with her on this matter.  There is data to indicate that after 6 months of treatment with rivaroxaban, it is effective to decrease the dose of rivaroxaban to 10 mg daily in preventing recurrent DVT and PE, but possibly less bleeding.  Therefore I have tended to decrease the dose after 6 months of treatment for my patients who are on indefinite anticoagulation.    The plan today is to check lupus anticoagulant, beta-2 glycoprotein, cardiolipin, D-dimer    If she does not have triple positive APS, then I recommend continuing on rivaroxaban (Xarelto) at a dose of 10 mg daily indefinitely.    She should return for follow-up visit in approximately 1 year, it can be by video visit.    Time: I spent a total of 60 minutes on the day of the visit. Please see the note for further information on patient assessment and treatment.       Radha Duncan MD  Hematology    Addendum 10/29/21- APS labs negative. D-dime slightly elevated. Continue on rivaroxaban 10 mg daily. RTC 1 year.  Radha Duncan MD

## 2021-10-26 LAB
DRVVT SCREEN RATIO: 1.03
INR PPP: 0.99 (ref 0.85–1.15)
LA PPP-IMP: NEGATIVE
LUPUS INTERPRETATION: NORMAL
PTT RATIO: 1.03
THROMBIN TIME: 17 SECONDS (ref 13–19)

## 2021-10-27 LAB
B2 GLYCOPROT1 IGG SERPL IA-ACNC: 1 U/ML
B2 GLYCOPROT1 IGM SERPL IA-ACNC: 3.7 U/ML
CARDIOLIPIN IGG SER IA-ACNC: <2 GPL-U/ML
CARDIOLIPIN IGG SER IA-ACNC: NEGATIVE
CARDIOLIPIN IGM SER IA-ACNC: 2.5 MPL-U/ML
CARDIOLIPIN IGM SER IA-ACNC: NEGATIVE

## 2021-11-02 ENCOUNTER — TELEPHONE (OUTPATIENT)
Dept: HEMATOLOGY | Facility: CLINIC | Age: 51
End: 2021-11-02

## 2021-11-02 DIAGNOSIS — Z86.711 HISTORY OF PULMONARY EMBOLISM: Primary | ICD-10-CM

## 2021-11-02 NOTE — TELEPHONE ENCOUNTER
Hollie Suarez has a history of pulmonary embolism, diagnosed 4/12/2021.  She was [facility] in the [facility] on 10/25/2021 by Dr. Radha Duncan.    Reaching out to her Hollie today to review plan per Dr. Duncan:     Lab result reviewed - labs for antiphospholipid antibody syndrome were all normal, but d-dimer remains slightly elevated. Discussed the meaning and implication of these results with Hollie Browne to stay on Xarelto, but can decrease to 10 mg daily. Rx sent to [facility] in [place], [place].     RTC 1 year with Duncan Chun PA-C or other [facility].     She is thinking about botox injections for her face and will call if she schedules this to discuss if Xarelto needs to be help.  She will call to schedule the follow up visit.    Hollie verbalized understanding of and agreement with the recommended plan and has the appropriate phone numbers to call with further  questions or concerns.     Caterina Miner RN - Nurse Clinician - [facility] - [phone]

## 2022-01-14 ENCOUNTER — TRANSCRIBE ORDERS (OUTPATIENT)
Dept: OTHER | Age: 52
End: 2022-01-14
Payer: COMMERCIAL

## 2022-01-14 DIAGNOSIS — I26.99 PULMONARY EMBOLISM, BILATERAL (H): Primary | ICD-10-CM

## 2022-07-27 ENCOUNTER — TELEPHONE (OUTPATIENT)
Dept: HEMATOLOGY | Facility: CLINIC | Age: 52
End: 2022-07-27

## 2022-08-25 ENCOUNTER — TELEPHONE (OUTPATIENT)
Dept: HEMATOLOGY | Facility: CLINIC | Age: 52
End: 2022-08-25

## 2022-08-25 NOTE — TELEPHONE ENCOUNTER
Called and spoke to patient about scheduling her annual return clot visit with [facility]. Patient stated she has been seen at [facility] and is unsure why she would need to come here. Discussed with RN and per RN as long as she has someone at [facility] that is prescribing Xarelto that Dr. Duncan had previously prescribed then she does not need to follow up here. Updated patient and she stated that yes everything is being done at [facility] and they are prescribing xarelto for her. There is no follow up needed.

## 2023-03-23 ENCOUNTER — HOSPITAL ENCOUNTER (EMERGENCY)
Facility: CLINIC | Age: 53
Discharge: HOME | End: 2023-03-24
Attending: FAMILY MEDICINE | Admitting: FAMILY MEDICINE
Payer: COMMERCIAL

## 2023-03-23 ENCOUNTER — APPOINTMENT (OUTPATIENT)
Dept: CT IMAGING | Facility: CLINIC | Age: 53
End: 2023-03-23
Attending: FAMILY MEDICINE
Payer: COMMERCIAL

## 2023-03-23 DIAGNOSIS — R07.9 CHEST PAIN, UNSPECIFIED TYPE: ICD-10-CM

## 2023-03-23 LAB
ANION GAP SERPL CALCULATED.3IONS-SCNC: 12 MMOL/L (ref 7–15)
BASOPHILS # BLD AUTO: 0 10E3/UL (ref 0–0.2)
BASOPHILS NFR BLD AUTO: 1 %
BUN SERPL-MCNC: 15.4 MG/DL (ref 6–20)
CALCIUM SERPL-MCNC: 9.7 MG/DL (ref 8.6–10)
CHLORIDE SERPL-SCNC: 105 MMOL/L (ref 98–107)
CREAT SERPL-MCNC: 0.91 MG/DL (ref 0.51–0.95)
DEPRECATED HCO3 PLAS-SCNC: 25 MMOL/L (ref 22–29)
EOSINOPHIL # BLD AUTO: 0.3 10E3/UL (ref 0–0.7)
EOSINOPHIL NFR BLD AUTO: 4 %
ERYTHROCYTE [DISTWIDTH] IN BLOOD BY AUTOMATED COUNT: 14.3 % (ref 10–15)
GFR SERPL CREATININE-BSD FRML MDRD: 75 ML/MIN/1.73M2
GLUCOSE SERPL-MCNC: 87 MG/DL (ref 70–99)
HCT VFR BLD AUTO: 40.9 % (ref 35–47)
HGB BLD-MCNC: 12.8 G/DL (ref 11.7–15.7)
IMM GRANULOCYTES # BLD: 0 10E3/UL
IMM GRANULOCYTES NFR BLD: 0 %
LYMPHOCYTES # BLD AUTO: 2 10E3/UL (ref 0.8–5.3)
LYMPHOCYTES NFR BLD AUTO: 33 %
MCH RBC QN AUTO: 26 PG (ref 26.5–33)
MCHC RBC AUTO-ENTMCNC: 31.3 G/DL (ref 31.5–36.5)
MCV RBC AUTO: 83 FL (ref 78–100)
MONOCYTES # BLD AUTO: 0.5 10E3/UL (ref 0–1.3)
MONOCYTES NFR BLD AUTO: 8 %
NEUTROPHILS # BLD AUTO: 3.3 10E3/UL (ref 1.6–8.3)
NEUTROPHILS NFR BLD AUTO: 54 %
NRBC # BLD AUTO: 0 10E3/UL
NRBC BLD AUTO-RTO: 0 /100
PLATELET # BLD AUTO: 409 10E3/UL (ref 150–450)
POTASSIUM SERPL-SCNC: 4.1 MMOL/L (ref 3.4–5.3)
RBC # BLD AUTO: 4.93 10E6/UL (ref 3.8–5.2)
SODIUM SERPL-SCNC: 142 MMOL/L (ref 136–145)
WBC # BLD AUTO: 6 10E3/UL (ref 4–11)

## 2023-03-23 PROCEDURE — 99284 EMERGENCY DEPT VISIT MOD MDM: CPT | Mod: 25 | Performed by: FAMILY MEDICINE

## 2023-03-23 PROCEDURE — 84484 ASSAY OF TROPONIN QUANT: CPT | Performed by: FAMILY MEDICINE

## 2023-03-23 PROCEDURE — 93010 ELECTROCARDIOGRAM REPORT: CPT | Performed by: FAMILY MEDICINE

## 2023-03-23 PROCEDURE — 85025 COMPLETE CBC W/AUTO DIFF WBC: CPT | Performed by: FAMILY MEDICINE

## 2023-03-23 PROCEDURE — 82310 ASSAY OF CALCIUM: CPT | Performed by: FAMILY MEDICINE

## 2023-03-23 PROCEDURE — 36415 COLL VENOUS BLD VENIPUNCTURE: CPT | Performed by: FAMILY MEDICINE

## 2023-03-23 PROCEDURE — 99285 EMERGENCY DEPT VISIT HI MDM: CPT | Mod: 25 | Performed by: FAMILY MEDICINE

## 2023-03-23 PROCEDURE — 96360 HYDRATION IV INFUSION INIT: CPT | Mod: 59 | Performed by: FAMILY MEDICINE

## 2023-03-23 PROCEDURE — 258N000003 HC RX IP 258 OP 636: Performed by: FAMILY MEDICINE

## 2023-03-23 PROCEDURE — 93005 ELECTROCARDIOGRAM TRACING: CPT | Performed by: FAMILY MEDICINE

## 2023-03-23 PROCEDURE — 71275 CT ANGIOGRAPHY CHEST: CPT

## 2023-03-23 RX ORDER — IOPAMIDOL 755 MG/ML
64 INJECTION, SOLUTION INTRAVASCULAR ONCE
Status: COMPLETED | OUTPATIENT
Start: 2023-03-24 | End: 2023-03-24

## 2023-03-23 RX ADMIN — SODIUM CHLORIDE 500 ML: 9 INJECTION, SOLUTION INTRAVENOUS at 23:05

## 2023-03-23 ASSESSMENT — ACTIVITIES OF DAILY LIVING (ADL): ADLS_ACUITY_SCORE: 35

## 2023-03-23 NOTE — LETTER
March 24, 2023      To Whom It May Concern:      Hollie Suarez was seen in our Emergency Department today, 03/24/23.  I expect her condition to improve over the next 1-2 days.  She may return to work/school when improved.    Sincerely,        Kushal Schuster MD

## 2023-03-24 VITALS
WEIGHT: 160 LBS | BODY MASS INDEX: 29.44 KG/M2 | DIASTOLIC BLOOD PRESSURE: 77 MMHG | HEIGHT: 62 IN | RESPIRATION RATE: 18 BRPM | HEART RATE: 61 BPM | SYSTOLIC BLOOD PRESSURE: 128 MMHG | TEMPERATURE: 97.3 F | OXYGEN SATURATION: 99 %

## 2023-03-24 LAB — TROPONIN T SERPL HS-MCNC: 7 NG/L

## 2023-03-24 PROCEDURE — 250N000011 HC RX IP 250 OP 636: Performed by: FAMILY MEDICINE

## 2023-03-24 PROCEDURE — 250N000009 HC RX 250: Performed by: FAMILY MEDICINE

## 2023-03-24 RX ADMIN — SODIUM CHLORIDE 98 ML: 9 INJECTION, SOLUTION INTRAVENOUS at 00:01

## 2023-03-24 RX ADMIN — IOPAMIDOL 64 ML: 755 INJECTION, SOLUTION INTRAVENOUS at 00:00

## 2023-03-24 ASSESSMENT — ACTIVITIES OF DAILY LIVING (ADL): ADLS_ACUITY_SCORE: 35

## 2023-03-24 NOTE — ED PROVIDER NOTES
HPI   Patient is a 52-year-old female presenting with chest pain.  She was hospitalized from 3/8 - 3/10 at [facility] for bilateral PE and DVT in her lower extremity.  She was seen by hematology.  She has a known history of DVT and PE and was on Xarelto 20 mg daily.  She admits to missing 2 doses.  Known history of cerebral palsy.  Former smoker.  1 alcoholic drink weekly.  No drugs of abuse.    Patient reports new pain in her chest starting today.  This morning she recognized left-sided chest pain that has been constant throughout the day.  It is worse with deep inspiration.  She reports right-sided chest pain starting this afternoon.  She has been extremely fatigued throughout the day and reports increased sleepiness.  She took a nap which is unusual for her.  No fever.  She thinks that her breathing is slightly worse today than it has been recently.  No new leg pain or swelling.  No cough or congestion.  No fever.  She has had some lightheadedness today but no fainting.  No palpitations.        Allergies:  Allergies   Allergen Reactions     Codeine Hives and Difficulty breathing     Paraphenylenediamine Itching and Swelling     PPD, Ingredient in hair dye: contact dermatitis     Buspar [Buspirone Hcl] Anxiety     Made anxiety worse.       Problem List:    Patient Active Problem List    Diagnosis Date Noted     Constipation, unspecified constipation type 11/09/2015     Priority: Medium     Refused influenza vaccine 11/09/2015     Priority: Medium     Refused diphtheria-pertussis-tetanus (DPT) vaccination 11/09/2015     Priority: Medium     URI (upper respiratory infection) 11/25/2013     Priority: Medium     S/P hysterectomy 04/29/2011     Priority: Medium     Back pain 04/29/2011     Priority: Medium     CARDIOVASCULAR SCREENING; LDL GOAL LESS THAN 160 10/31/2010     Priority: Medium     Infantile cerebral palsy (H) 12/21/2007     Priority: Medium     Occasional valium use  Problem list name updated by automated  "process. Provider to review       Human papillomavirus in conditions classified elsewhere and of unspecified site 10/22/2007     Priority: Medium     10/10/07:Pap--NIL, + HPV 66. Repap with HPV 1 year.       Genital herpes 2007     Priority: Medium     Problem list name updated by automated process. Provider to review        Past Medical History:    No past medical history on file.  Past Surgical History:    Past Surgical History:   Procedure Laterality Date     HYSTERECTOMY, PAP NO LONGER INDICATED      Total      Family History:    Family History   Problem Relation Age of Onset     Cancer Mother         smooth muscle tissues     Neurologic Disorder Father         aneurism     Cancer Maternal Grandmother         cervical cancer     Social History:  Marital Status:   [2]  Social History     Tobacco Use     Smoking status: Former     Packs/day: 1.00     Types: Cigarettes     Quit date: 2015     Years since quittin.5   Substance Use Topics     Alcohol use: Yes     Comment: 1 glass of wine weekly     Drug use: No      Medications:    ADVIL 200 MG OR TABS  bisacodyl (DULCOLAX) 5 MG EC tablet  buPROPion (WELLBUTRIN XL) 150 MG 24 hr tablet  DIAZEPAM PO  Escitalopram Oxalate (LEXAPRO PO)  PHENTERMINE HCL PO  rivaroxaban ANTICOAGULANT (XARELTO) 10 MG TABS tablet      Review of Systems   All other systems reviewed and are negative.      PE   BP: (!) 140/74  Pulse: 88  Temp: 97.3  F (36.3  C)  Resp: 18  Height: 157.5 cm (5' 2\")  Weight: 72.6 kg (160 lb)  SpO2: 98 %  Physical Exam  Vitals reviewed.   Constitutional:       General: She is not in acute distress.     Appearance: She is well-developed.   HENT:      Head: Normocephalic and atraumatic.      Right Ear: External ear normal.      Left Ear: External ear normal.      Nose: Nose normal.      Mouth/Throat:      Mouth: Mucous membranes are moist.      Pharynx: Oropharynx is clear.   Eyes:      Extraocular Movements: Extraocular movements intact.     "  Conjunctiva/sclera: Conjunctivae normal.      Pupils: Pupils are equal, round, and reactive to light.   Cardiovascular:      Rate and Rhythm: Normal rate and regular rhythm.   Pulmonary:      Effort: Pulmonary effort is normal.      Breath sounds: Normal breath sounds.   Chest:      Chest wall: No tenderness.   Abdominal:      Palpations: Abdomen is soft.      Tenderness: There is no abdominal tenderness.   Musculoskeletal:         General: Normal range of motion.      Cervical back: Normal range of motion.   Skin:     General: Skin is warm and dry.   Neurological:      Mental Status: She is alert and oriented to person, place, and time.   Psychiatric:         Behavior: Behavior normal.         ED COURSE and MDM   2254.  Patient has symptoms and signs as described above.  Patient with chest pain and a known history of hospitalization and PE, as above.  Unfortunately, the prior CT scan is within the [facility] system.  Repeat CT scan here in the ED, fluid bolus, lab work.    0044.  CT imaging shows no evidence of acute clot.  Lungs are without pathology.  No pathology identified with aorta.  No evidence for pericarditis or pericardial effusion.  Troponin pending.  If negative, the patient will be discharged home and follow-up.    EKG  (2259)   Interpretation performed by me.  Rate: 62     Rhythm: sinus     Axis: nl  Intervals: ID (12-2) 142, QRS (<12) 80, QTc (>5) 433  P wave: nl     QRS complex: nl   ST segment / T-wave: nl  Conclusion: nl    Electronic medical chart reviewed, including medical problems, medications, medical allergies, social history.  Recent hospitalizations and surgical procedures reviewed.  Recent clinic visits and consultations reviewed.  Recent labs and test results reviewed.  Nursing notes reviewed.    0108.  The patient, their parent if applicable, and/or their medical decision maker(s) and I have reviewed all of the available historical information, applicable PMH, physical exam findings, and  objective diagnostic data gathered during this ED visit.  We then discussed all work-up options and then together agreed upon the course taken during this visit.  The ultimate disposition and plan was a cooperative decision made between myself and the patient, their parent if applicable, and/or their legal decision maker(s).  The risks and benefits of all decisions made during this visit were discussed to the best of my abilities given the circumstances, and all parties are understanding of the pertinent ramifications of these decisions.      LABS  Labs Ordered and Resulted from Time of ED Arrival to Time of ED Departure   CBC WITH PLATELETS AND DIFFERENTIAL - Abnormal       Result Value    WBC Count 6.0      RBC Count 4.93      Hemoglobin 12.8      Hematocrit 40.9      MCV 83      MCH 26.0 (*)     MCHC 31.3 (*)     RDW 14.3      Platelet Count 409      % Neutrophils 54      % Lymphocytes 33      % Monocytes 8      % Eosinophils 4      % Basophils 1      % Immature Granulocytes 0      NRBCs per 100 WBC 0      Absolute Neutrophils 3.3      Absolute Lymphocytes 2.0      Absolute Monocytes 0.5      Absolute Eosinophils 0.3      Absolute Basophils 0.0      Absolute Immature Granulocytes 0.0      Absolute NRBCs 0.0     BASIC METABOLIC PANEL - Normal    Sodium 142      Potassium 4.1      Chloride 105      Carbon Dioxide (CO2) 25      Anion Gap 12      Urea Nitrogen 15.4      Creatinine 0.91      Calcium 9.7      Glucose 87      GFR Estimate 75     TROPONIN T, HIGH SENSITIVITY - Normal    Troponin T, High Sensitivity 7         IMAGING  Images reviewed by me.  Radiology report also reviewed.  CT Chest Pulmonary Embolism w Contrast   Final Result   IMPRESSION:   1.  Bilateral pulmonary emboli have resolved. No new emboli.    2.  No acute pulmonary disease.          Procedures    Medications   0.9% sodium chloride BOLUS (0 mLs Intravenous Stopped 3/24/23 0030)   iopamidol (ISOVUE-370) solution 64 mL (64 mLs Intravenous $Given  3/24/23 0000)   sodium chloride 0.9 % bag 500mL for CT scan flush use (98 mLs As instructed $Given 3/24/23 0001)         IMPRESSION       ICD-10-CM    1. Chest pain, unspecified type  R07.9                Medication List      There are no discharge medications for this visit.                     Kushal Schuster MD  03/24/23 0108

## 2023-03-24 NOTE — DISCHARGE INSTRUCTIONS
RETURN TO THE EMERGENCY ROOM FOR THE FOLLOWING:    Severely worsened breathing, severely worsened pain, fever greater than 101, vomiting and dehydration, fainting, or at anytime for any concern.    FOLLOW UP:    With your primary clinic for further discussion about your pain.  A referral order was placed at the time of your discharge.  Expect a phone call within the next 2-3 business days to help with scheduling.    TREATMENT RECOMMENDATIONS:    Prilosec daily x 30 days.  Maalox/Mylanta/Tums for acute pain.  Avoid caffeine, smoking, chewing tobacco, ibuprofen/advil/aleve, alcohol, eating within 2-3 hours of bed.    NURSE ADVICE LINE:  [phone] or [phone]

## 2023-03-24 NOTE — ED TRIAGE NOTES
HX of blood clots in lungs and legs. Discharged from hospital for more blood clots 10 days ago. Reports left posterior back/lung pain started yesterday and right side started today. Denies SOB, does not appear in distress during triage. Denies any missed doses of Xarelto.     Triage Assessment     Row Name 03/23/23 3272       Triage Assessment (Adult)    Airway WDL WDL       Respiratory WDL    Respiratory WDL WDL       Skin Circulation/Temperature WDL    Skin Circulation/Temperature WDL WDL       Cardiac WDL    Cardiac WDL WDL       Peripheral/Neurovascular WDL    Peripheral Neurovascular WDL WDL       Cognitive/Neuro/Behavioral WDL    Cognitive/Neuro/Behavioral WDL WDL

## 2024-03-03 ENCOUNTER — APPOINTMENT (OUTPATIENT)
Dept: CT IMAGING | Facility: CLINIC | Age: 54
End: 2024-03-03
Attending: STUDENT IN AN ORGANIZED HEALTH CARE EDUCATION/TRAINING PROGRAM
Payer: COMMERCIAL

## 2024-03-03 ENCOUNTER — HOSPITAL ENCOUNTER (EMERGENCY)
Facility: CLINIC | Age: 54
Discharge: HOME | End: 2024-03-03
Attending: STUDENT IN AN ORGANIZED HEALTH CARE EDUCATION/TRAINING PROGRAM | Admitting: STUDENT IN AN ORGANIZED HEALTH CARE EDUCATION/TRAINING PROGRAM
Payer: COMMERCIAL

## 2024-03-03 VITALS
WEIGHT: 163 LBS | HEIGHT: 61 IN | TEMPERATURE: 98.5 F | DIASTOLIC BLOOD PRESSURE: 81 MMHG | RESPIRATION RATE: 16 BRPM | BODY MASS INDEX: 30.78 KG/M2 | HEART RATE: 84 BPM | SYSTOLIC BLOOD PRESSURE: 112 MMHG | OXYGEN SATURATION: 100 %

## 2024-03-03 DIAGNOSIS — M25.551 RIGHT HIP PAIN: ICD-10-CM

## 2024-03-03 LAB
ALBUMIN UR-MCNC: NEGATIVE MG/DL
APPEARANCE UR: CLEAR
BILIRUB UR QL STRIP: NEGATIVE
COLOR UR AUTO: YELLOW
GLUCOSE UR STRIP-MCNC: NEGATIVE MG/DL
HGB UR QL STRIP: NEGATIVE
KETONES UR STRIP-MCNC: NEGATIVE MG/DL
LEUKOCYTE ESTERASE UR QL STRIP: NEGATIVE
MUCOUS THREADS #/AREA URNS LPF: PRESENT /LPF
NITRATE UR QL: NEGATIVE
PH UR STRIP: 5 [PH] (ref 5–7)
RBC URINE: <1 /HPF
SP GR UR STRIP: 1.02 (ref 1–1.03)
SQUAMOUS EPITHELIAL: 1 /HPF
UROBILINOGEN UR STRIP-MCNC: 2 MG/DL
WBC URINE: 1 /HPF

## 2024-03-03 PROCEDURE — 250N000013 HC RX MED GY IP 250 OP 250 PS 637: Performed by: STUDENT IN AN ORGANIZED HEALTH CARE EDUCATION/TRAINING PROGRAM

## 2024-03-03 PROCEDURE — 99284 EMERGENCY DEPT VISIT MOD MDM: CPT | Mod: 25

## 2024-03-03 PROCEDURE — 73700 CT LOWER EXTREMITY W/O DYE: CPT | Mod: RT

## 2024-03-03 PROCEDURE — 81001 URINALYSIS AUTO W/SCOPE: CPT | Performed by: STUDENT IN AN ORGANIZED HEALTH CARE EDUCATION/TRAINING PROGRAM

## 2024-03-03 PROCEDURE — 99284 EMERGENCY DEPT VISIT MOD MDM: CPT | Performed by: STUDENT IN AN ORGANIZED HEALTH CARE EDUCATION/TRAINING PROGRAM

## 2024-03-03 RX ORDER — OXYCODONE HYDROCHLORIDE 5 MG/1
2.5-5 TABLET ORAL EVERY 4 HOURS PRN
Qty: 10 TABLET | Refills: 0 | Status: SHIPPED | OUTPATIENT
Start: 2024-03-03

## 2024-03-03 RX ADMIN — OXYCODONE HYDROCHLORIDE 2.5 MG: 5 TABLET ORAL at 20:00

## 2024-03-03 ASSESSMENT — ACTIVITIES OF DAILY LIVING (ADL)
ADLS_ACUITY_SCORE: 35
ADLS_ACUITY_SCORE: 33

## 2024-03-03 NOTE — ED TRIAGE NOTES
States having right hip and leg pain. Has pins and rods in that leg. Unknown injury. Just took a long car ride on 2 /28. Has hx of cerebral palsey. States she is incontinent of bladder.

## 2024-03-04 NOTE — ED PROVIDER NOTES
History     Chief Complaint   Patient presents with    Leg Pain     HPI  Hollie Suarez is a 53 year old female with documented PMH of infantile cerebral palsy and VTE chronically prescribed Xarelto presents to the department for evaluation of atraumatic right hip pain.  Patient explains that she had a minor fall over 2 weeks ago without pain or injury noted afterwards.  She subsequently went on a trip to [place] where she had been ambulatory and without pain or concern, but after returning home on Wednesday began to appreciate some mild right hip discomfort.  For the past 5 days she has had progressive right hip pain making weightbearing increasingly difficult.  The patient has attempted to take prescription medications including tizanidine but without relief.  She is without fever, chills, or infectious symptoms.  No lower extremity swelling or calf pain/tenderness.        Allergies:  Allergies   Allergen Reactions    Codeine Hives    Morphine And Related Hives and Difficulty breathing    Paraphenylenediamine Itching and Swelling     PPD, Ingredient in hair dye: contact dermatitis    Buspar [Buspirone Hcl] Anxiety     Made anxiety worse.         Problem List:    Patient Active Problem List    Diagnosis Date Noted    Constipation, unspecified constipation type 11/09/2015     Priority: Medium    Refused influenza vaccine 11/09/2015     Priority: Medium    Refused diphtheria-pertussis-tetanus (DPT) vaccination 11/09/2015     Priority: Medium    URI (upper respiratory infection) 11/25/2013     Priority: Medium    S/P hysterectomy 04/29/2011     Priority: Medium    Back pain 04/29/2011     Priority: Medium    CARDIOVASCULAR SCREENING; LDL GOAL LESS THAN 160 10/31/2010     Priority: Medium    Infantile cerebral palsy (H) 12/21/2007     Priority: Medium     Occasional valium use  Problem list name updated by automated process. Provider to review      Human papillomavirus in conditions classified elsewhere and  "of unspecified site 10/22/2007     Priority: Medium     10/10/07:Pap--NIL, + HPV 65. Repap with HPV 1 year.      Genital herpes 2007     Priority: Medium     Problem list name updated by automated process. Provider to review          Past Medical History:    No past medical history on file.    Past Surgical History:    Past Surgical History:   Procedure Laterality Date    HYSTERECTOMY, PAP NO LONGER INDICATED      Total        Family History:    Family History   Problem Relation Age of Onset    Cancer Mother         smooth muscle tissues    Neurologic Disorder Father         aneurism    Cancer Maternal Grandmother         cervical cancer       Social History:  Marital Status:   [2]  Social History     Tobacco Use    Smoking status: Former     Packs/day: 1     Types: Cigarettes     Quit date: 2015     Years since quittin.5   Substance Use Topics    Alcohol use: Yes     Comment: 1 glass of wine weekly    Drug use: No        Medications:    oxyCODONE (ROXICODONE) 5 MG tablet  ADVIL 200 MG OR TABS  bisacodyl (DULCOLAX) 5 MG EC tablet  buPROPion (WELLBUTRIN XL) 150 MG 24 hr tablet  DIAZEPAM PO  Escitalopram Oxalate (LEXAPRO PO)  PHENTERMINE HCL PO  rivaroxaban ANTICOAGULANT (XARELTO) 10 MG TABS tablet          Review of Systems   ROS: 10 point ROS neg other than the symptoms noted above in the HPI.    Physical Exam   BP: 112/81  Pulse: 84  Temp: 98.5  F (36.9  C)  Resp: 16  Height: 154.9 cm (5' 1\")  Weight: 73.9 kg (163 lb)  SpO2: 100 %      Physical Exam  Constitutional:  Well developed, well nourished.  Appears nontoxic and in no acute distress.  Resting comfortably on the gurney.  HENT:  Normocephalic and atraumatic.  Symmetric in appearance.  Eyes:  Conjunctivae are normal.  Cardiovascular:  No cyanosis.  RRR.  No audible murmurs noted.  No lower extremity edema or asymmetry.   Respiratory:  Effort normal without sign of respiratory distress.  No audible wheezing or stridor.  CTAB. "   Gastrointestinal:  Soft nondistended abdomen.  Nontender and without guarding.  No rigidity or rebound tenderness.  Negative Leach's sign.  Negative McBurney's point.    Musculoskeletal:  Negative for hip tenderness or pelvic instability.  Moves extremities spontaneously.  4/5 strength in bilateral hip flexors and knee extensors, baseline per patient.  Neurological:  Patient is alert and conversing appropriately.  Lower extremity sensation intact and equal bilaterally.  Skin:  Skin is warm and dry.  Psychiatric:  Normal mood and affect.      ED Course                 Results for orders placed or performed during the hospital encounter of 03/03/24 (from the past 24 hour(s))   CT Hip Right w/o Contrast    Narrative    EXAM: CT HIP RIGHT W/O CONTRAST  LOCATION: [facility]  DATE: 3/3/2024    INDICATION: Atraumatic right hip pain  COMPARISON: None available.  TECHNIQUE: Noncontrast. Axial, sagittal and coronal thin-section reconstruction. Dose reduction techniques were used.     FINDINGS:     BONES:  -Prior blade plate and screw fixation of the proximal right femur. No evidence of hardware failure. No acute fracture. Mild degenerative arthrosis of the right hip with small marginal osteophytes. Mild lower lumbar facet arthropathy and degenerative   interspace narrowing.    SOFT TISSUES:  -Advanced fatty atrophy of the iliopsoas muscles bilaterally of indeterminate etiology. Mild asymmetric fatty atrophy of the right proximal quadriceps. Hysterectomy.      Impression    IMPRESSION:  1.  Prior plate and screw fixation of the proximal right femur. No evidence of hardware failure.  2.  No acute fracture.  3.  Mild degenerative arthrosis of the right hip.     UA with Microscopic reflex to Culture    Specimen: Urine, Midstream   Result Value Ref Range    Color Urine Yellow Colorless, Straw, Light Yellow, Yellow    Appearance Urine Clear Clear    Glucose Urine Negative Negative mg/dL    Bilirubin  "Urine Negative Negative    Ketones Urine Negative Negative mg/dL    Specific Gravity Urine 1.020 1.003 - 1.035    Blood Urine Negative Negative    pH Urine 5.0 5.0 - 7.0    Protein Albumin Urine Negative Negative mg/dL    Urobilinogen Urine 2.0 Normal, 2.0 mg/dL    Nitrite Urine Negative Negative    Leukocyte Esterase Urine Negative Negative    Mucus Urine Present (A) None Seen /LPF    RBC Urine <1 <=2 /HPF    WBC Urine 1 <=5 /HPF    Squamous Epithelials Urine 1 <=1 /HPF    Narrative    Urine Culture not indicated       Medications   oxyCODONE IR (ROXICODONE) half-tab 2.5 mg (2.5 mg Oral $Given 3/3/24 2000)       Assessments & Plan (with Medical Decision Making)   Hollie Suarez is a 53 year old female who presents to the emergency department for complaint of 5 days of progressive right sided hip pain exacerbated with weightbearing and ambulation.  Based on her symptoms and the clinical examination, there is no evidence to suggest bony deformity, skin injury, tendon rupture, joint laxity, DVT or neurovascular deficits.  CT imaging of hip was obtained and read, \"No evidence of hardware failure...  No acute fracture\" based on radiology report.  At this time etiology is uncertain but low suspicion for septic arthritis, radiculopathy or vascular occlusion.  She will be started on short course of low-dose oxycodone with recommendation to follow-up as outpatient with orthopedics.  Patient also notes that she has a history of right-sided hip pain for which she has obtained relief via \"steroid injection\" of the right hip but pain has not been this severe in the past.  Also recommend follow-up with outpatient primary care provider.          Disclaimer:  This note consists of symbols derived from keyboarding, dictation, and/or voice recognition software.  As a result, there may be errors in the script that have gone undetected.  Please consider this when interpreting information found in the chart.       I have reviewed " the nursing notes.    I have reviewed the findings, diagnosis, plan and need for follow up with the patient.          New Prescriptions    OXYCODONE (ROXICODONE) 5 MG TABLET    Take 0.5-1 tablets (2.5-5 mg) by mouth every 4 hours as needed for severe pain       Final diagnoses:   Right hip pain       3/3/2024   [facility] EMERGENCY DEPT       Riaz Rowland DO  03/03/24 2022

## 2024-10-14 ENCOUNTER — APPOINTMENT (OUTPATIENT)
Dept: CT IMAGING | Facility: CLINIC | Age: 54
End: 2024-10-14
Payer: COMMERCIAL

## 2024-10-14 ENCOUNTER — HOSPITAL ENCOUNTER (EMERGENCY)
Facility: CLINIC | Age: 54
Discharge: HOME | End: 2024-10-14
Payer: COMMERCIAL

## 2024-10-14 VITALS
RESPIRATION RATE: 18 BRPM | SYSTOLIC BLOOD PRESSURE: 111 MMHG | HEART RATE: 72 BPM | BODY MASS INDEX: 27.79 KG/M2 | WEIGHT: 151 LBS | DIASTOLIC BLOOD PRESSURE: 70 MMHG | HEIGHT: 62 IN | TEMPERATURE: 98.4 F | OXYGEN SATURATION: 94 %

## 2024-10-14 DIAGNOSIS — K59.00 CONSTIPATION: ICD-10-CM

## 2024-10-14 DIAGNOSIS — R55 VASOVAGAL NEAR SYNCOPE: ICD-10-CM

## 2024-10-14 LAB
ALBUMIN SERPL BCG-MCNC: 4 G/DL (ref 3.5–5.2)
ALP SERPL-CCNC: 65 U/L (ref 40–150)
ALT SERPL W P-5'-P-CCNC: 10 U/L (ref 0–50)
ANION GAP SERPL CALCULATED.3IONS-SCNC: 15 MMOL/L (ref 7–15)
AST SERPL W P-5'-P-CCNC: 21 U/L (ref 0–45)
BASOPHILS # BLD AUTO: 0 10E3/UL (ref 0–0.2)
BASOPHILS NFR BLD AUTO: 0 %
BILIRUB SERPL-MCNC: 0.3 MG/DL
BUN SERPL-MCNC: 17.7 MG/DL (ref 6–20)
CALCIUM SERPL-MCNC: 9.7 MG/DL (ref 8.8–10.4)
CHLORIDE SERPL-SCNC: 101 MMOL/L (ref 98–107)
CREAT SERPL-MCNC: 0.64 MG/DL (ref 0.51–0.95)
EGFRCR SERPLBLD CKD-EPI 2021: >90 ML/MIN/1.73M2
EOSINOPHIL # BLD AUTO: 0.1 10E3/UL (ref 0–0.7)
EOSINOPHIL NFR BLD AUTO: 1 %
ERYTHROCYTE [DISTWIDTH] IN BLOOD BY AUTOMATED COUNT: 16.4 % (ref 10–15)
GLUCOSE SERPL-MCNC: 75 MG/DL (ref 70–99)
HCO3 SERPL-SCNC: 23 MMOL/L (ref 22–29)
HCT VFR BLD AUTO: 37.7 % (ref 35–47)
HGB BLD-MCNC: 11.5 G/DL (ref 11.7–15.7)
IMM GRANULOCYTES # BLD: 0 10E3/UL
IMM GRANULOCYTES NFR BLD: 0 %
LIPASE SERPL-CCNC: 33 U/L (ref 13–60)
LYMPHOCYTES # BLD AUTO: 1.8 10E3/UL (ref 0.8–5.3)
LYMPHOCYTES NFR BLD AUTO: 20 %
MCH RBC QN AUTO: 21.9 PG (ref 26.5–33)
MCHC RBC AUTO-ENTMCNC: 30.5 G/DL (ref 31.5–36.5)
MCV RBC AUTO: 72 FL (ref 78–100)
MONOCYTES # BLD AUTO: 0.5 10E3/UL (ref 0–1.3)
MONOCYTES NFR BLD AUTO: 6 %
NEUTROPHILS # BLD AUTO: 6.5 10E3/UL (ref 1.6–8.3)
NEUTROPHILS NFR BLD AUTO: 73 %
NRBC # BLD AUTO: 0 10E3/UL
NRBC BLD AUTO-RTO: 0 /100
PLATELET # BLD AUTO: 510 10E3/UL (ref 150–450)
POTASSIUM SERPL-SCNC: 4.9 MMOL/L (ref 3.4–5.3)
PROT SERPL-MCNC: 7.4 G/DL (ref 6.4–8.3)
RBC # BLD AUTO: 5.26 10E6/UL (ref 3.8–5.2)
SODIUM SERPL-SCNC: 139 MMOL/L (ref 135–145)
TROPONIN T SERPL HS-MCNC: 6 NG/L
WBC # BLD AUTO: 8.9 10E3/UL (ref 4–11)

## 2024-10-14 PROCEDURE — 36415 COLL VENOUS BLD VENIPUNCTURE: CPT

## 2024-10-14 PROCEDURE — 99284 EMERGENCY DEPT VISIT MOD MDM: CPT

## 2024-10-14 PROCEDURE — 99285 EMERGENCY DEPT VISIT HI MDM: CPT | Mod: 25

## 2024-10-14 PROCEDURE — 93010 ELECTROCARDIOGRAM REPORT: CPT

## 2024-10-14 PROCEDURE — 250N000011 HC RX IP 250 OP 636

## 2024-10-14 PROCEDURE — 84484 ASSAY OF TROPONIN QUANT: CPT

## 2024-10-14 PROCEDURE — 82040 ASSAY OF SERUM ALBUMIN: CPT

## 2024-10-14 PROCEDURE — 74177 CT ABD & PELVIS W/CONTRAST: CPT

## 2024-10-14 PROCEDURE — 250N000009 HC RX 250

## 2024-10-14 PROCEDURE — 85025 COMPLETE CBC W/AUTO DIFF WBC: CPT

## 2024-10-14 PROCEDURE — 93005 ELECTROCARDIOGRAM TRACING: CPT

## 2024-10-14 PROCEDURE — 83690 ASSAY OF LIPASE: CPT

## 2024-10-14 RX ORDER — IOPAMIDOL 755 MG/ML
74 INJECTION, SOLUTION INTRAVASCULAR ONCE
Status: COMPLETED | OUTPATIENT
Start: 2024-10-14 | End: 2024-10-14

## 2024-10-14 RX ADMIN — SODIUM CHLORIDE 58 ML: 9 INJECTION, SOLUTION INTRAVENOUS at 18:51

## 2024-10-14 RX ADMIN — IOPAMIDOL 74 ML: 755 INJECTION, SOLUTION INTRAVENOUS at 18:51

## 2024-10-14 ASSESSMENT — COLUMBIA-SUICIDE SEVERITY RATING SCALE - C-SSRS
1. IN THE PAST MONTH, HAVE YOU WISHED YOU WERE DEAD OR WISHED YOU COULD GO TO SLEEP AND NOT WAKE UP?: NO
6. HAVE YOU EVER DONE ANYTHING, STARTED TO DO ANYTHING, OR PREPARED TO DO ANYTHING TO END YOUR LIFE?: NO
2. HAVE YOU ACTUALLY HAD ANY THOUGHTS OF KILLING YOURSELF IN THE PAST MONTH?: NO

## 2024-10-14 ASSESSMENT — ACTIVITIES OF DAILY LIVING (ADL)
ADLS_ACUITY_SCORE: 35

## 2024-10-14 NOTE — ED PROVIDER NOTES
History     Chief Complaint   Patient presents with    Constipation    Abdominal Pain       Hollie Suarez is a 54 year old female with significant pmhx of cerebral palsy, constipation who presents for evaluation of constipation. Patient states she has been dealing with constipation since starting wegovy 7 weeks ago. She states it had been 7 days since her last BM, so she took dulcolax last night and attempted to have a BM today. She states she was pushing and straining to have a BM today at 1200 when she suddenly became lightheaded and felt like she was going to pass out. She then became nauseous and starting vomiting, and she did pass a small solid stool followed by liquid stool. She did not lose consciousness. Her son was there and was able to help her immediately following this episode. She did not have AMS. Her nausea resolved shortly after, but she continues to feel discomfort in her abdomen. She states she has had lightheadedness with straining in the past. She admits she does not drink much water during the day. She tried miralax a few weeks ago but states it made her nauseous so she stopped. At present she denies fever, cough, sore throat, chest pain, SOA, n/v, dysuria, hematochezia, melena.      Allergies:  Allergies   Allergen Reactions    Codeine Hives    Morphine And Codeine Hives and Difficulty breathing    Paraphenylenediamine Itching and Swelling     PPD, Ingredient in hair dye: contact dermatitis    Buspar [Buspirone Hcl] Anxiety     Made anxiety worse.         Problem List:    Patient Active Problem List    Diagnosis Date Noted    Constipation, unspecified constipation type 11/09/2015     Priority: Medium    Refused influenza vaccine 11/09/2015     Priority: Medium    Refused diphtheria-pertussis-tetanus (DPT) vaccination 11/09/2015     Priority: Medium    URI (upper respiratory infection) 11/25/2013     Priority: Medium    S/P hysterectomy 04/29/2011     Priority: Medium    Back pain  "2011     Priority: Medium    CARDIOVASCULAR SCREENING; LDL GOAL LESS THAN 160 10/31/2010     Priority: Medium    Infantile cerebral palsy (H) 2007     Priority: Medium     Occasional valium use  Problem list name updated by automated process. Provider to review      Human papillomavirus in conditions classified elsewhere and of unspecified site 10/22/2007     Priority: Medium     10/10/07:Pap--NIL, + HPV 66. Repap with HPV 1 year.      Genital herpes 2007     Priority: Medium     Problem list name updated by automated process. Provider to review          Past Medical History:    No past medical history on file.    Past Surgical History:    Past Surgical History:   Procedure Laterality Date    HYSTERECTOMY, PAP NO LONGER INDICATED      Total        Family History:    Family History   Problem Relation Age of Onset    Cancer Mother         smooth muscle tissues    Neurologic Disorder Father         aneurism    Cancer Maternal Grandmother         cervical cancer       Social History:  Marital Status:  Single [1]  Social History     Tobacco Use    Smoking status: Former     Current packs/day: 0.00     Types: Cigarettes     Quit date: 2015     Years since quittin.1   Substance Use Topics    Alcohol use: Yes     Comment: 1 glass of wine weekly    Drug use: No        Medications:    ADVIL 200 MG OR TABS  bisacodyl (DULCOLAX) 5 MG EC tablet  buPROPion (WELLBUTRIN XL) 150 MG 24 hr tablet  DIAZEPAM PO  Escitalopram Oxalate (LEXAPRO PO)  oxyCODONE (ROXICODONE) 5 MG tablet  PHENTERMINE HCL PO  rivaroxaban ANTICOAGULANT (XARELTO) 10 MG TABS tablet          Physical Exam   BP: (!) 142/100  Pulse: 79  Temp: 98.4  F (36.9  C)  Resp: 18  Height: 157.5 cm (5' 2\")  Weight: 68.5 kg (151 lb)  SpO2: 100 %      Physical Exam  Vitals and nursing note reviewed.   Constitutional:       General: She is not in acute distress.     Appearance: She is not ill-appearing, toxic-appearing or diaphoretic.   HENT:      " Head: Normocephalic and atraumatic.   Cardiovascular:      Rate and Rhythm: Normal rate and regular rhythm.   Pulmonary:      Effort: Pulmonary effort is normal.   Abdominal:      Palpations: Abdomen is soft.      Tenderness: There is no abdominal tenderness. There is no guarding or rebound.   Neurological:      General: No focal deficit present.      Mental Status: She is alert and oriented to person, place, and time.   Psychiatric:         Mood and Affect: Mood normal.         Behavior: Behavior normal.         ED Course        Procedures                    Results for orders placed or performed during the hospital encounter of 10/14/24 (from the past 24 hour(s))   CBC with platelets differential    Narrative    The following orders were created for panel order CBC with platelets differential.  Procedure                               Abnormality         Status                     ---------                               -----------         ------                     CBC with platelets and d...[[id number]]  Abnormal            Final result                 Please view results for these tests on the individual orders.   CBC with platelets and differential   Result Value Ref Range    WBC Count 8.9 4.0 - 11.0 10e3/uL    RBC Count 5.26 (H) 3.80 - 5.20 10e6/uL    Hemoglobin 11.5 (L) 11.7 - 15.7 g/dL    Hematocrit 37.7 35.0 - 47.0 %    MCV 72 (L) 78 - 100 fL    MCH 21.9 (L) 26.5 - 33.0 pg    MCHC 30.5 (L) 31.5 - 36.5 g/dL    RDW 16.4 (H) 10.0 - 15.0 %    Platelet Count 510 (H) 150 - 450 10e3/uL    % Neutrophils 73 %    % Lymphocytes 20 %    % Monocytes 6 %    % Eosinophils 1 %    % Basophils 0 %    % Immature Granulocytes 0 %    NRBCs per 100 WBC 0 <1 /100    Absolute Neutrophils 6.5 1.6 - 8.3 10e3/uL    Absolute Lymphocytes 1.8 0.8 - 5.3 10e3/uL    Absolute Monocytes 0.5 0.0 - 1.3 10e3/uL    Absolute Eosinophils 0.1 0.0 - 0.7 10e3/uL    Absolute Basophils 0.0 0.0 - 0.2 10e3/uL    Absolute Immature Granulocytes 0.0 <=0.4  10e3/uL    Absolute NRBCs 0.0 10e3/uL   Comprehensive metabolic panel   Result Value Ref Range    Sodium 139 135 - 145 mmol/L    Potassium 4.9 3.4 - 5.3 mmol/L    Carbon Dioxide (CO2) 23 22 - 29 mmol/L    Anion Gap 15 7 - 15 mmol/L    Urea Nitrogen 17.7 6.0 - 20.0 mg/dL    Creatinine 0.64 0.51 - 0.95 mg/dL    GFR Estimate >90 >60 mL/min/1.73m2    Calcium 9.7 8.8 - 10.4 mg/dL    Chloride 101 98 - 107 mmol/L    Glucose 75 70 - 99 mg/dL    Alkaline Phosphatase 65 40 - 150 U/L    AST 21 0 - 45 U/L    ALT 10 0 - 50 U/L    Protein Total 7.4 6.4 - 8.3 g/dL    Albumin 4.0 3.5 - 5.2 g/dL    Bilirubin Total 0.3 <=1.2 mg/dL   Lipase   Result Value Ref Range    Lipase 33 13 - 60 U/L   Troponin T, High Sensitivity   Result Value Ref Range    Troponin T, High Sensitivity 6 <=14 ng/L   CT Abdomen Pelvis w Contrast    Narrative    EXAM: CT ABDOMEN PELVIS W CONTRAST  LOCATION: [facility]  DATE: 10/14/2024    INDICATION: constipation, abdominal pain  COMPARISON: CT abdomen and pelvis 10/11/2007  TECHNIQUE: CT scan of the abdomen and pelvis was performed following injection of IV contrast. Multiplanar reformats were obtained. Dose reduction techniques were used.  CONTRAST: 74 mL Isovue 370    FINDINGS:   LOWER CHEST: Mild scarring or atelectasis both lower lung fields. Moderate size esophageal hiatal hernia.    HEPATOBILIARY: A few tiny hypodense liver lesions, too small to further characterize, but likely represent cysts.    PANCREAS: Normal.    SPLEEN: Normal.    ADRENAL GLANDS: Normal.    KIDNEYS/BLADDER: No stones or hydronephrosis. Tiny cyst lower pole left kidney.    BOWEL: Normal appendix. No evidence for bowel obstruction. No bowel wall thickening or inflammatory changes. Small amount of stool in the colon.    LYMPH NODES: Normal.    VASCULATURE: Normal.    PELVIC ORGANS: Normal.    MUSCULOSKELETAL: Internal fixation hardware proximal right femur. Scoliosis. Degenerative disc disease L5  interspace.      Impression    IMPRESSION:   1.  No evidence for constipation or bowel obstruction.  2.  No findings to account for the patient's abdominal pain.  3.  Moderate size esophageal hiatal hernia.          EKG Interpretation:      EKG Number: 1  Interpreted by Jackie Tam PA-C  Symptoms at time of EKG: None   Rhythm: Normal sinus   Rate: Normal  Axis: Normal  Ectopy: None  Conduction: Normal  ST Segments/ T Waves: No ST-T wave changes and No acute ischemic changes  Q Waves: None  Comparison to prior: No old EKG available    Clinical Impression: normal EKG    Medications   iopamidol (ISOVUE-370) solution 74 mL (74 mLs Intravenous $Given 10/14/24 1851)   sodium chloride 0.9 % bag 500mL for CT scan flush use (58 mLs Intravenous $Given 10/14/24 1851)       Assessments & Plan (with Medical Decision Making)     I have reviewed the nursing notes.    I have reviewed the findings, diagnosis, plan and need for follow up with the patient.    Medical Decision Making  Hollie Suarez is a 54 year old female with significant pmhx of cerebral palsy, constipation who presents for evaluation of constipation, near syncopal event. Ddx include vasovagal event, arrhythmia, ACS, PE, medication side effect, bowel obstruction, other acute intraabdominal pathology. Vital signs WNL. Patient is not significantly hypertensive, she is afebrile, she is not tachycardic, and she is satting 100% on RA with a regular respiratory rate and effort.     On examination patient is overall well appearing, alert and oriented, non-toxic. Abdomen is soft and non-tender, but patient endorses generalized discomfort. Heart with regular rate and rhythm. CBC with mildly low hgb of 11.5, however this is unchanged from prior of 11.2 in February.  CMP negative for electrolyte abnormality, renal dysfunction, liver dysfunction.  Lipase within normal limits.  EKG negative for acute ischemic changes or arrhythmia.  It showed normal sinus rhythm.  Initial  troponin is 6 approximately 6 hours following the presyncopal event.  Low suspicion for ACS given no significant rise in troponin, no associated or ongoing chest pain, nausea/vomiting, difficulty breathing.  Patient's reported event of becoming lightheaded, nauseous, and vomiting while straining to have a bowel movement is consistent with likely vasovagal event.  She reports similar events previously when straining to have a bowel movement.  CT of the abdomen/pelvis was negative for any acute pathology.  It did not show significant stool burden, showing only a small amount of stool in the colon.    Overall workup is grossly unremarkable.  Patient does not have evidence of constipation on her CT scan.  Findings were discussed with the patient.  We did discuss that she is likely producing less stool than previously given her markedly reduced diet on Wegovy.  Recommended she continue Amitiza daily, as well as adding a fiber supplement and attempting to use MiraLAX again if there are concerns for ongoing constipation.  Also recommended she significantly increase her fluid intake as she reports less than 16 ounces of water daily.  We discussed that dehydration can contribute to problems with constipation.  She was instructed to continue using Dulcolax as needed for severe constipation.  Patient was instructed to return to the ER if she develops severe abdominal pain, vomiting, fever, hematochezia, melena, chest pain, shortness of breath, or if other concerning symptoms develop.  Patient voiced understanding of the plan and had no further questions.      Discharge Medication List as of 10/14/2024  7:34 PM          Final diagnoses:   Constipation   Vasovagal near syncope       Jackie Tam PA-C  6/8/2024   [facility] EMERGENCY DEPT     Jackie Tam PA-C  10/14/24 2012

## 2024-10-14 NOTE — ED TRIAGE NOTES
Pt is on Wegovy and is constipated. She took some ducolax and was on the toilet when almost had a syncopal episode. Had a small BM and then vomited. Hx of cerebral palsy.      Triage Assessment (Adult)       Row Name 10/14/24 8471          Triage Assessment    Airway WDL WDL        Respiratory WDL    Respiratory WDL WDL        Skin Circulation/Temperature WDL    Skin Circulation/Temperature WDL WDL        Cardiac WDL    Cardiac WDL WDL        Peripheral/Neurovascular WDL    Peripheral Neurovascular WDL WDL        Cognitive/Neuro/Behavioral WDL    Cognitive/Neuro/Behavioral WDL WDL

## 2024-10-15 NOTE — DISCHARGE INSTRUCTIONS
Continue Amitiza daily for constipation. I recommend taking the fiber supplement Metamucil, as well as Miralax to help soften stool and reduce constipation. Increase your fluid intake as dehydration can lead to worsening constipation. You can continue using dulcolax as needed for severe constipation.     Return to the ER if you develop severe abdominal pain, bloody stools, black/tarry stools, chest pain, shortness of breath, pass out, or if other concerning symptoms develop.

## 2025-05-30 ENCOUNTER — APPOINTMENT (OUTPATIENT)
Dept: GENERAL RADIOLOGY | Facility: CLINIC | Age: 55
End: 2025-05-30
Attending: EMERGENCY MEDICINE
Payer: COMMERCIAL

## 2025-05-30 ENCOUNTER — HOSPITAL ENCOUNTER (EMERGENCY)
Facility: CLINIC | Age: 55
Discharge: HOME | End: 2025-05-30
Attending: EMERGENCY MEDICINE | Admitting: EMERGENCY MEDICINE
Payer: COMMERCIAL

## 2025-05-30 VITALS
BODY MASS INDEX: 29.08 KG/M2 | OXYGEN SATURATION: 94 % | WEIGHT: 159 LBS | SYSTOLIC BLOOD PRESSURE: 130 MMHG | TEMPERATURE: 98.7 F | HEART RATE: 92 BPM | DIASTOLIC BLOOD PRESSURE: 77 MMHG | RESPIRATION RATE: 24 BRPM

## 2025-05-30 DIAGNOSIS — J18.9 ATYPICAL PNEUMONIA: ICD-10-CM

## 2025-05-30 PROBLEM — K21.9 GERD WITHOUT ESOPHAGITIS: Status: ACTIVE | Noted: 2020-01-11

## 2025-05-30 PROBLEM — I26.99 ACUTE PULMONARY EMBOLISM WITHOUT ACUTE COR PULMONALE (H): Status: ACTIVE | Noted: 2022-03-16

## 2025-05-30 LAB
ALBUMIN SERPL BCG-MCNC: 3.9 G/DL (ref 3.5–5.2)
ALP SERPL-CCNC: 89 U/L (ref 40–150)
ALT SERPL W P-5'-P-CCNC: 11 U/L (ref 0–50)
ANION GAP SERPL CALCULATED.3IONS-SCNC: 11 MMOL/L (ref 7–15)
AST SERPL W P-5'-P-CCNC: 22 U/L (ref 0–45)
BASE EXCESS BLDV CALC-SCNC: 1.3 MMOL/L (ref -3–3)
BILIRUB DIRECT SERPL-MCNC: 0.1 MG/DL (ref 0–0.3)
BILIRUB SERPL-MCNC: 0.3 MG/DL
BUN SERPL-MCNC: 17.5 MG/DL (ref 6–20)
CALCIUM SERPL-MCNC: 9.6 MG/DL (ref 8.8–10.4)
CHLORIDE SERPL-SCNC: 102 MMOL/L (ref 98–107)
CREAT SERPL-MCNC: 0.99 MG/DL (ref 0.51–0.95)
EGFRCR SERPLBLD CKD-EPI 2021: 67 ML/MIN/1.73M2
ERYTHROCYTE [DISTWIDTH] IN BLOOD BY AUTOMATED COUNT: 18.5 % (ref 10–15)
FLUAV RNA SPEC QL NAA+PROBE: NEGATIVE
FLUBV RNA RESP QL NAA+PROBE: NEGATIVE
GLUCOSE SERPL-MCNC: 93 MG/DL (ref 70–99)
HCO3 BLDV-SCNC: 28 MMOL/L (ref 21–28)
HCO3 SERPL-SCNC: 28 MMOL/L (ref 22–29)
HCT VFR BLD AUTO: 35.6 % (ref 35–47)
HGB BLD-MCNC: 10.3 G/DL (ref 11.7–15.7)
LACTATE SERPL-SCNC: 1.4 MMOL/L (ref 0.7–2)
MCH RBC QN AUTO: 20.5 PG (ref 26.5–33)
MCHC RBC AUTO-ENTMCNC: 28.9 G/DL (ref 31.5–36.5)
MCV RBC AUTO: 71 FL (ref 78–100)
NT-PROBNP SERPL-MCNC: 94 PG/ML (ref 0–226)
O2/TOTAL GAS SETTING VFR VENT: 21 %
OXYHGB MFR BLDV: 43 % (ref 70–75)
PCO2 BLDV: 50 MM HG (ref 40–50)
PH BLDV: 7.35 [PH] (ref 7.32–7.43)
PLATELET # BLD AUTO: 414 10E3/UL (ref 150–450)
PO2 BLDV: 28 MM HG (ref 25–47)
POTASSIUM SERPL-SCNC: 3.8 MMOL/L (ref 3.4–5.3)
PROT SERPL-MCNC: 7.4 G/DL (ref 6.4–8.3)
RBC # BLD AUTO: 5.03 10E6/UL (ref 3.8–5.2)
RSV RNA SPEC NAA+PROBE: NEGATIVE
SAO2 % BLDV: 43.9 % (ref 70–75)
SARS-COV-2 RNA RESP QL NAA+PROBE: NEGATIVE
SODIUM SERPL-SCNC: 141 MMOL/L (ref 135–145)
TROPONIN T SERPL HS-MCNC: 9 NG/L
WBC # BLD AUTO: 14 10E3/UL (ref 4–11)

## 2025-05-30 PROCEDURE — 82040 ASSAY OF SERUM ALBUMIN: CPT | Performed by: EMERGENCY MEDICINE

## 2025-05-30 PROCEDURE — 82805 BLOOD GASES W/O2 SATURATION: CPT | Performed by: EMERGENCY MEDICINE

## 2025-05-30 PROCEDURE — 87637 SARSCOV2&INF A&B&RSV AMP PRB: CPT | Performed by: EMERGENCY MEDICINE

## 2025-05-30 PROCEDURE — 71046 X-RAY EXAM CHEST 2 VIEWS: CPT

## 2025-05-30 PROCEDURE — 99284 EMERGENCY DEPT VISIT MOD MDM: CPT | Performed by: EMERGENCY MEDICINE

## 2025-05-30 PROCEDURE — 82310 ASSAY OF CALCIUM: CPT | Performed by: EMERGENCY MEDICINE

## 2025-05-30 PROCEDURE — 258N000003 HC RX IP 258 OP 636: Performed by: EMERGENCY MEDICINE

## 2025-05-30 PROCEDURE — 85027 COMPLETE CBC AUTOMATED: CPT | Performed by: EMERGENCY MEDICINE

## 2025-05-30 PROCEDURE — 99284 EMERGENCY DEPT VISIT MOD MDM: CPT | Mod: 25 | Performed by: EMERGENCY MEDICINE

## 2025-05-30 PROCEDURE — 36415 COLL VENOUS BLD VENIPUNCTURE: CPT | Performed by: EMERGENCY MEDICINE

## 2025-05-30 PROCEDURE — 83880 ASSAY OF NATRIURETIC PEPTIDE: CPT | Performed by: EMERGENCY MEDICINE

## 2025-05-30 PROCEDURE — 83605 ASSAY OF LACTIC ACID: CPT | Performed by: EMERGENCY MEDICINE

## 2025-05-30 PROCEDURE — 96360 HYDRATION IV INFUSION INIT: CPT | Performed by: EMERGENCY MEDICINE

## 2025-05-30 PROCEDURE — 84484 ASSAY OF TROPONIN QUANT: CPT | Performed by: EMERGENCY MEDICINE

## 2025-05-30 RX ORDER — AZITHROMYCIN 250 MG/1
TABLET, FILM COATED ORAL
Qty: 6 TABLET | Refills: 0 | Status: SHIPPED | OUTPATIENT
Start: 2025-05-30 | End: 2025-06-04

## 2025-05-30 RX ADMIN — SODIUM CHLORIDE 1000 ML: 9 INJECTION, SOLUTION INTRAVENOUS at 13:35

## 2025-05-30 ASSESSMENT — ACTIVITIES OF DAILY LIVING (ADL)
ADLS_ACUITY_SCORE: 41

## 2025-05-30 ASSESSMENT — COLUMBIA-SUICIDE SEVERITY RATING SCALE - C-SSRS
6. HAVE YOU EVER DONE ANYTHING, STARTED TO DO ANYTHING, OR PREPARED TO DO ANYTHING TO END YOUR LIFE?: NO
1. IN THE PAST MONTH, HAVE YOU WISHED YOU WERE DEAD OR WISHED YOU COULD GO TO SLEEP AND NOT WAKE UP?: NO
2. HAVE YOU ACTUALLY HAD ANY THOUGHTS OF KILLING YOURSELF IN THE PAST MONTH?: NO

## 2025-05-30 NOTE — ED PROVIDER NOTES
[facility] EMERGENCY DEPT  PHYSICIAN NOTE    MRN: [id number]    FINAL IMPRESSION     1. Atypical pneumonia          ED COURSE & MDM     Patient presented for cough and dyspnea.  Initial vital signs notable for mild tachypnea.  Patient's symptoms raise the concern for bacterial pneumonia, septic workup initiated.  She does have a leukocytosis, but her labs are otherwise reassuring including a normal lactate.  Chest x-ray does not show any focal pneumonia, though with her smoking history I believe she is at an increased risk for an atypical pneumonia and believe treatment with azithromycin is appropriate despite no radiographic findings.  COVID, influenza, and RSV testing is negative.  Differential diagnosis considered includes lung abscess, viral infection, acute CHF, pneumothorax, and pleural effusion.  Patient discharged in stable condition.  Return precautions provided.  All questions answered.    Medications Administered During This ED Encounter  Medications   sodium chloride 0.9% BOLUS 1,000 mL (0 mLs Intravenous Stopped 5/30/25 5729)         ===================================================================    HPI     Hollie Suarez is a 54 year old female with relevant PMH significant for PE on Xarelto, cerebral palsy, and GERD presenting with worsening dyspnea since last night.  Patient states she has had a cough for a couple weeks, but it was not that bad before.  The cough worsened yesterday and her breathing became labored, feeling dyspneic even at rest.  Symptoms continued to worsen today prompting her ED visit.  She has not been able to tolerate any food or liquids due to process of emesis, though she denies nausea.  She has had a fever, Tmax last night was 102.3.  She has chest pain with coughing, but denies abdominal pain, lower extremity pain/swelling, focal weakness.    No relevant previous documentation in the patient's chart.    ROS  All other ROS negative.    Problem list,  medications, allergies, PMH, PSH, family history, and social history reviewed and updated as able in Epic.      PHYSICAL EXAM     Vitals:    05/30/25 1340 05/30/25 1400 05/30/25 1410 05/30/25 1430   BP: 103/70 117/76  130/77   Pulse:  89  92   Resp:       Temp:       TempSrc:       SpO2: 95% 94% 94% 94%   Weight:            Constitutional: Alert, no acute distress.  CV: Normal rate, regular rhythm. Peripheral pulses intact and symmetric.  Pulm: Non-labored respirations. Inspiratory breath sounds clear and full bilaterally without rales or rhonchi. No expiratory wheezing.  Abdomen: Soft, non-tender.  MSK: No edema or calf tenderness.  Neuro: Oriented to person, place, and time. Normal speech. No focal deficits.      TESTING   All testing reviewed and independently interpreted.    EKG  None    LABS  Labs Ordered and Resulted from Time of ED Arrival to Time of ED Departure   CBC WITH PLATELETS - Abnormal       Result Value    WBC Count 14.0 (*)     RBC Count 5.03      Hemoglobin 10.3 (*)     Hematocrit 35.6      MCV 71 (*)     MCH 20.5 (*)     MCHC 28.9 (*)     RDW 18.5 (*)     Platelet Count 414     BASIC METABOLIC PANEL - Abnormal    Sodium 141      Potassium 3.8      Chloride 102      Carbon Dioxide (CO2) 28      Anion Gap 11      Urea Nitrogen 17.5      Creatinine 0.99 (*)     GFR Estimate 67      Calcium 9.6      Glucose 93     BLOOD GAS VENOUS - Abnormal    pH Venous 7.35      pCO2 Venous 50      pO2 Venous 28      Bicarbonate Venous 28      Base Excess/Deficit Venous 1.3      FIO2 21      Oxyhemoglobin Venous 43 (*)     O2 Sat, Venous 43.9 (*)    INFLUENZA A/B, RSV AND SARS-COV2 PCR - Normal    Influenza A PCR Negative      Influenza B PCR Negative      RSV PCR Negative      SARS CoV2 PCR Negative     LACTIC ACID WHOLE BLOOD - Normal    Lactic Acid 1.4     HEPATIC FUNCTION PANEL - Normal    Protein Total 7.4      Albumin 3.9      Bilirubin Total 0.3      Alkaline Phosphatase 89      AST 22      ALT 11       Bilirubin Direct 0.10     TROPONIN T, HIGH SENSITIVITY - Normal    Troponin T, High Sensitivity 9     NT-PROBNP - Normal    NT-proBNP 94         IMAGING  XR Chest 2 Views   Final Result   IMPRESSION:    1. Small to moderate size hiatal hernia. Unremarkable cardiac silhouette.   2. No acute cardiopulmonary process.                 Asp, MD Sammy  05/30/25 0105

## 2025-05-30 NOTE — ED TRIAGE NOTES
2 day history of cough, SOB, and fever T max 102.3 last night.   +smoker.   Covid negative at home.        Triage Assessment (Adult)       Row Name 05/30/25 2767          Triage Assessment    Airway WDL WDL        Respiratory WDL    Respiratory WDL X;cough     Cough Frequency frequent     Cough Type congested        Skin Circulation/Temperature WDL    Skin Circulation/Temperature WDL WDL        Cardiac WDL    Cardiac WDL WDL        Peripheral/Neurovascular WDL    Peripheral Neurovascular WDL WDL        Cognitive/Neuro/Behavioral WDL    Cognitive/Neuro/Behavioral WDL WDL